# Patient Record
Sex: MALE | Race: WHITE | NOT HISPANIC OR LATINO | URBAN - METROPOLITAN AREA
[De-identification: names, ages, dates, MRNs, and addresses within clinical notes are randomized per-mention and may not be internally consistent; named-entity substitution may affect disease eponyms.]

---

## 2021-03-02 DIAGNOSIS — Z23 ENCOUNTER FOR IMMUNIZATION: ICD-10-CM

## 2021-03-04 ENCOUNTER — IMMUNIZATIONS (OUTPATIENT)
Dept: FAMILY MEDICINE CLINIC | Facility: HOSPITAL | Age: 71
End: 2021-03-04

## 2021-03-04 DIAGNOSIS — Z23 ENCOUNTER FOR IMMUNIZATION: Primary | ICD-10-CM

## 2021-03-04 PROCEDURE — 0001A SARS-COV-2 / COVID-19 MRNA VACCINE (PFIZER-BIONTECH) 30 MCG: CPT

## 2021-03-04 PROCEDURE — 91300 SARS-COV-2 / COVID-19 MRNA VACCINE (PFIZER-BIONTECH) 30 MCG: CPT

## 2021-03-23 ENCOUNTER — IMMUNIZATIONS (OUTPATIENT)
Dept: FAMILY MEDICINE CLINIC | Facility: HOSPITAL | Age: 71
End: 2021-03-23

## 2021-03-23 DIAGNOSIS — Z23 ENCOUNTER FOR IMMUNIZATION: Primary | ICD-10-CM

## 2021-03-23 PROCEDURE — 0002A SARS-COV-2 / COVID-19 MRNA VACCINE (PFIZER-BIONTECH) 30 MCG: CPT

## 2021-03-23 PROCEDURE — 91300 SARS-COV-2 / COVID-19 MRNA VACCINE (PFIZER-BIONTECH) 30 MCG: CPT

## 2022-05-09 NOTE — PROGRESS NOTES
PT Evaluation     Today's date: 5/10/2022  Patient name: Chente Rooney  : 1950  MRN: [id number]  Referring provider: Boom Dukes DO  Dx:   Encounter Diagnosis     ICD-10-CM    1  Partial hamstring tear, right, subsequent encounter  S76 819C                   Assessment  Assessment details: Chente Rooney is a 74 y o  male who presents with signs and symptoms consistent with referring diagnosis of hamstring strain  He demonstrates decreased flexibility, decreased LE strength, abnormal gait, decreased activity tolerance, and decreased function  Patient would benefit from skilled physical therapy in order to address said deficits and improve functional mobility  Understanding of Dx/Px/POC: good   Prognosis: good    Goals  STG:  Decrease pain 1 grade  Increase ROM >25 %  Independent with basic HEP    LTG:  Decrease pain 2 grades  Increase ROM 50%  Improve flexibility to moderate restriction  Increase FOTO to expected score   Increase strength to 5/5   Pt will be able to weed without difficulty   Normalize gait     Plan  Patient would benefit from: skilled physical therapy  Planned therapy interventions: abdominal trunk stabilization, joint mobilization, massage, neuromuscular re-education, patient education, postural training, strengthening, stretching, therapeutic activities, therapeutic exercise, flexibility, functional ROM exercises, gait training, home exercise program, balance, body mechanics training and manual therapy  Frequency: 2x week  Duration in weeks: 6  Treatment plan discussed with: patient        Subjective Evaluation    History of Present Illness  Mechanism of injury: Patient reports on  he was moving a very heavy ~ 300# birth bath and felt a pop in his right hamstring, Pain still present when moving or sitting, no prior hx   He has contiued swelling into leg and foot  Bruising has subsided   Feels increased pain when sitting and stretching muscle, bending down quickly to pick something up  No pain to drive, pain lifting his leg , cannot pull weeds because of pain  Cant sit for > 1 hour due to pain     Reynold Silva is a 74 y o  male complains of right thigh pain  Onset of the symptoms was 2022  Mechanism of injury: Lifting down statue-estimate over 300 pounds  Aggravating factors: Bending forward at the waist  Treatment to date: none  Symptoms have stabilized      The following portions of the patient's history were reviewed and updated as appropriate: allergies, current medications, past family history, past medical history, past social history, past surgical history and problem list         Pain  Current pain ratin  At worst pain ratin    Patient Goals  Patient goal: weeding/yard work, sit without pain, pick things up        Objective     Tenderness     Additional Tenderness Details  Pain at medial hamstring insertion of semi membranosis/tendonosis at ischial tuberosity     Active Range of Motion     Right Knee   Normal active range of motion    Additional Active Range of Motion Details  90/90 hamstring left:57 degrees        Right: 38 degrees with pain     Left quad severe restriction  Right quad moderate restriction   Right hamstring moderate restriction-with pain   Left hamstring moderate restriction       Strength/Myotome Testing     Right Hip   Planes of Motion   Flexion: 4  Extension: 4-  Abduction: 4    Right Knee   Flexion: 4-  Extension: 5             Precautions: None        Manuals 10                                                                Neuro Re-Ed             Quad sets              steamboats             SLS NV                                                                Ther Ex             bike NV            Hamstring seated  20"x3            Prone quad stretch strap NV            glute bridge  x10            Clamshells  NV            SLR flexion NV            SLR abduction  NV            Knee ext machine LAQ            Ham curl machine standing ham curl x10             Leg press              Prone hamstring eccentric lowering NV            TB side step  NV                         Ther Activity             Step up and over                                                                  Modalities

## 2022-05-10 ENCOUNTER — EVALUATION (OUTPATIENT)
Dept: PHYSICAL THERAPY | Facility: CLINIC | Age: 72
End: 2022-05-10
Payer: MEDICARE

## 2022-05-10 DIAGNOSIS — S76.311D PARTIAL HAMSTRING TEAR, RIGHT, SUBSEQUENT ENCOUNTER: Primary | ICD-10-CM

## 2022-05-10 PROCEDURE — 97161 PT EVAL LOW COMPLEX 20 MIN: CPT | Performed by: PHYSICAL THERAPIST

## 2022-05-10 PROCEDURE — 97110 THERAPEUTIC EXERCISES: CPT | Performed by: PHYSICAL THERAPIST

## 2022-05-10 NOTE — LETTER
May 11, 2022    Maximo Dee DO  [address]  [facility] [address] [address]    Patient: Dalton Valdes   YOB: 1950   Date of Visit: 5/10/2022     Encounter Diagnosis     ICD-10-CM    1  Partial hamstring tear, right, subsequent encounter  S76 311D        Dear Dr Savage Faulkner: Thank you for your recent referral of Dalton Valdes  Please review the attached evaluation summary from Anthony's recent visit  Please verify that you agree with the plan of care by signing the attached order  If you have any questions or concerns, please do not hesitate to call  I sincerely appreciate the opportunity to share in the care of one of your patients and hope to have another opportunity to work with you in the near future  Sincerely,    Gaurav Pham, PT      Referring Provider:      I certify that I have read the below Plan of Care and certify the need for these services furnished under this plan of treatment while under my care  Maximo DO Luiz  [facility] [address] [address]  Via Fax: [phone]          PT Evaluation     Today's date: 5/10/2022  Patient name: Dalton Valdes  : 1950  MRN: [id number]  Referring provider: Ena Vigil DO  Dx:   Encounter Diagnosis     ICD-10-CM    1  Partial hamstring tear, right, subsequent encounter  S76 311D                   Assessment  Assessment details: Dalton Valdes is a 74 y o  male who presents with signs and symptoms consistent with referring diagnosis of hamstring strain  He demonstrates decreased flexibility, decreased LE strength, abnormal gait, decreased activity tolerance, and decreased function  Patient would benefit from skilled physical therapy in order to address said deficits and improve functional mobility     Understanding of Dx/Px/POC: good   Prognosis: good    Goals  STG:  Decrease pain 1 grade  Increase ROM >25 %  Independent with basic HEP    LTG:  Decrease pain 2 grades  Increase ROM 50%  Improve flexibility to moderate restriction  Increase FOTO to expected score   Increase strength to 5/5   Pt will be able to weed without difficulty   Normalize gait     Plan  Patient would benefit from: skilled physical therapy  Planned therapy interventions: abdominal trunk stabilization, joint mobilization, massage, neuromuscular re-education, patient education, postural training, strengthening, stretching, therapeutic activities, therapeutic exercise, flexibility, functional ROM exercises, gait training, home exercise program, balance, body mechanics training and manual therapy  Frequency: 2x week  Duration in weeks: 6  Treatment plan discussed with: patient        Subjective Evaluation    History of Present Illness  Mechanism of injury: Patient reports on  he was moving a very heavy ~ 300# birth bath and felt a pop in his right hamstring, Pain still present when moving or sitting, no prior hx   He has contiued swelling into leg and foot  Bruising has subsided  Feels increased pain when sitting and stretching muscle, bending down quickly to pick something up  No pain to drive, pain lifting his leg , cannot pull weeds because of pain  Cant sit for > 1 hour due to pain     Guillermina Emery is a 74 y o  male complains of right thigh pain  Onset of the symptoms was 2022  Mechanism of injury: Lifting down statue-estimate over 300 pounds  Aggravating factors: Bending forward at the waist  Treatment to date: none  Symptoms have stabilized      The following portions of the patient's history were reviewed and updated as appropriate: allergies, current medications, past family history, past medical history, past social history, past surgical history and problem list         Pain  Current pain ratin  At worst pain ratin    Patient Goals  Patient goal: weeding/yard work, sit without pain, pick things up        Objective     Tenderness     Additional Tenderness Details  Pain at medial hamstring insertion of semi membranosis/tendonosis at ischial tuberosity     Active Range of Motion     Right Knee   Normal active range of motion    Additional Active Range of Motion Details  90/90 hamstring left:57 degrees        Right: 38 degrees with pain     Left quad severe restriction  Right quad moderate restriction   Right hamstring moderate restriction-with pain   Left hamstring moderate restriction       Strength/Myotome Testing     Right Hip   Planes of Motion   Flexion: 4  Extension: 4-  Abduction: 4    Right Knee   Flexion: 4-  Extension: 5             Precautions: None        Manuals 5/10                                                                Neuro Re-Ed             Quad sets              steamboats             SLS NV                                                                Ther Ex             bike NV            Hamstring seated  20"x3            Prone quad stretch strap NV            glute bridge  x10            Clamshells  NV            SLR flexion NV            SLR abduction  NV            Knee ext machine LAQ            Ham curl machine standing ham curl x10             Leg press              Prone hamstring eccentric lowering NV            TB side step  NV                         Ther Activity             Step up and over                                                                  Modalities

## 2022-05-18 ENCOUNTER — OFFICE VISIT (OUTPATIENT)
Dept: PHYSICAL THERAPY | Facility: CLINIC | Age: 72
End: 2022-05-18
Payer: MEDICARE

## 2022-05-18 DIAGNOSIS — S76.311D PARTIAL HAMSTRING TEAR, RIGHT, SUBSEQUENT ENCOUNTER: Primary | ICD-10-CM

## 2022-05-18 PROCEDURE — 97110 THERAPEUTIC EXERCISES: CPT | Performed by: PHYSICAL THERAPIST

## 2022-05-18 PROCEDURE — 97112 NEUROMUSCULAR REEDUCATION: CPT | Performed by: PHYSICAL THERAPIST

## 2022-05-18 PROCEDURE — 97140 MANUAL THERAPY 1/> REGIONS: CPT | Performed by: PHYSICAL THERAPIST

## 2022-05-18 NOTE — PROGRESS NOTES
Daily Note     Today's date: 2022  Patient name: Justus Schaffer  : 1950  MRN: [id number]  Referring provider: Hubert Koyanagi, DO  Dx:   Encounter Diagnosis     ICD-10-CM    1  Partial hamstring tear, right, subsequent encounter  S70 011Z                   Subjective: Patient with no complaints after IE  He states he feels "stiff"  He is concerned with having to do weeding later today  Objective: See treatment diary below      Assessment: Tolerated treatment well  Patient would benefit from continued PT  He has soreness with exercises, especially prone eccentric h/s curl with R TB  He has no increased heat noted with laser tx  No difficulty or complaints with exercises though continued cues needed throughout program for proper form  Plan: Progress treatment as tolerated         Precautions: None        Manuals 5/10 5/18           Laser to prox h/s   4:30 20 W                                                  Neuro Re-Ed             [employer]   20x10"           steamboats  NV           SLS  NV                                                               Ther Ex             bike NV 5'           Hamstring seated  20"x3 20"X3           Prone quad stretch strap NV            glute bridge  x10            Clamshells  NV NV           SLR flexion  NV           SLR abduction   NV           Knee ext machine LAQ 4# 3x10           Ham curl machine standing ham curl x10  2x10            Leg press              Prone hamstring eccentric lowering NV R TB loop x10           TB side step  NV 4 laps                         Ther Activity             Step up and over                                                                  Modalities

## 2022-05-25 ENCOUNTER — OFFICE VISIT (OUTPATIENT)
Dept: PHYSICAL THERAPY | Facility: CLINIC | Age: 72
End: 2022-05-25
Payer: MEDICARE

## 2022-05-25 DIAGNOSIS — S76.311D PARTIAL HAMSTRING TEAR, RIGHT, SUBSEQUENT ENCOUNTER: Primary | ICD-10-CM

## 2022-05-25 PROCEDURE — 97112 NEUROMUSCULAR REEDUCATION: CPT

## 2022-05-25 PROCEDURE — 97110 THERAPEUTIC EXERCISES: CPT

## 2022-05-25 NOTE — PROGRESS NOTES
Daily Note     Today's date: 2022  Patient name: Wes Qureshi  : 1950  MRN: [id number]  Referring provider: Asuncion Briceño DO  Dx:   Encounter Diagnosis     ICD-10-CM    1  Partial hamstring tear, right, subsequent encounter  A25 752E                   Subjective: Pt reports he was only "a little sore" following last visit  Objective: See treatment diary below      Assessment: Tolerated treatment fair  Patient would benefit from continued PT  Difficulty w/ SL hip abduction due to decreased strength  No adverse effects noted w/ Laser tx  Cues still needed to maintain proper technique  No increased sx noted w/ TE performed  Plan: Progress treatment as tolerated         Precautions: None        Manuals 5/10 5/18 5/25          Laser to prox h/s   4:30 21 W 4:30 20W                                                 Neuro Re-Ed             [employer]   20x10" 10x10"          steamboats  NV           SLS  NV                                                               Ther Ex             bike NV 5' 5'          Hamstring seated  20"x3 20"X3 20"x3          Prone quad stretch strap NV            glute bridge  x10  x15          Clamshells  NV NV NV          SLR flexion  NV x10           SLR abduction   NV x10          Knee ext machine LAQ 4# 3x10 4# 2x10          Ham curl machine standing ham curl x10  2x10  2x10          Leg press              Prone hamstring eccentric lowering NV R TB loop x10 RTB loop  2x  10          TB side step  NV 4 laps  4 laps                       Ther Activity             Step up and over                                                                  Modalities                                           1:1 1115-12

## 2022-06-01 ENCOUNTER — OFFICE VISIT (OUTPATIENT)
Dept: PHYSICAL THERAPY | Facility: CLINIC | Age: 72
End: 2022-06-01
Payer: MEDICARE

## 2022-06-01 DIAGNOSIS — S76.311D PARTIAL HAMSTRING TEAR, RIGHT, SUBSEQUENT ENCOUNTER: Primary | ICD-10-CM

## 2022-06-01 PROCEDURE — 97112 NEUROMUSCULAR REEDUCATION: CPT

## 2022-06-01 PROCEDURE — 97110 THERAPEUTIC EXERCISES: CPT

## 2022-06-01 NOTE — PROGRESS NOTES
Daily Note     Today's date: 2022  Patient name: Sree Sanchez  : 1950  MRN: [id number]  Referring provider: Iván Broderick DO  Dx:   Encounter Diagnosis     ICD-10-CM    1  Partial hamstring tear, right, subsequent encounter  S72 284D                   Subjective: Pt reports no increase in sx lately, and states he is "trying not to overdo it"  Objective: See treatment diary below      Assessment: Tolerated treatment well  Patient would benefit from continued PT  Fair tolerance to addition of steamboats; watch form w/ hip extension  No adverse effects w/ Laser tx  Pt notes "stiffness" post tx  Plan: Continue per plan of care        Precautions: None        Manuals 5/10 5/18 5/25 6/1         Laser to prox h/s   4:30 20 W 4:30 20W 20W 4:30                                                Neuro Re-Ed             Quad sets   20x10" 10x10"          steamboats  NV  RTB 10 ea BL         SLS  NV                                                               Ther Ex             bike NV 5' 5' 5'         Hamstring seated  20"x3 20"X3 20"x3 20"x3         Prone quad stretch strap NV            glute bridge  x10  x15 2x10         Clamshells  NV NV NV NV         SLR flexion  NV x10  x15         SLR abduction   NV x10 x15         Knee ext machine LAQ 4# 3x10 4# 2x10 4# 2x10         Ham curl machine standing ham curl x10  2x10  2x10          Leg press              Prone hamstring eccentric lowering NV R TB loop x10 RTB loop  2x  10 RTB loop 2x10         TB side step  NV 4 laps  4 laps 2 laps RTB                       Ther Activity             Step up and over                                                                  Modalities

## 2022-06-08 ENCOUNTER — OFFICE VISIT (OUTPATIENT)
Dept: PHYSICAL THERAPY | Facility: CLINIC | Age: 72
End: 2022-06-08
Payer: MEDICARE

## 2022-06-08 DIAGNOSIS — S76.311D PARTIAL HAMSTRING TEAR, RIGHT, SUBSEQUENT ENCOUNTER: Primary | ICD-10-CM

## 2022-06-08 PROCEDURE — 97530 THERAPEUTIC ACTIVITIES: CPT

## 2022-06-08 PROCEDURE — 97112 NEUROMUSCULAR REEDUCATION: CPT

## 2022-06-08 PROCEDURE — 97110 THERAPEUTIC EXERCISES: CPT

## 2022-06-08 NOTE — PROGRESS NOTES
Daily Note     Today's date: 2022  Patient name: Ivelisse Gann  : 1950  MRN: [id number]  Referring provider: Alva Kendrick DO  Dx:   Encounter Diagnosis     ICD-10-CM    1  Partial hamstring tear, right, subsequent encounter  S76 374D                   Subjective: Pt states he is feeling "a little better"  Pt states he continues to have discomfort when bending over to pull weeds  Pt also reports he notices sensitivity when sitting in certain chairs, depending on where the edge of the seat hits the hamstring  Objective: See treatment diary below      Assessment: Tolerated treatment well  Patient would benefit from continued PT  Tolerates addition of step up and overs well  Few cues needed for proper technique w/ TE  Challenged by S/L hip abduction due to weakness  No adverse effects noted w/ Laser  Plan: Progress treatment as tolerated  MDV   RE NV       Precautions: None        Manuals 5/10 5/18 5/25 6/1 6/8        Laser to prox h/s   4:30 20 W 4:30 20W 20W 4:30 20W 4:30                                               Neuro Re-Ed             Quad sets   20x10" 10x10"          steamboats  NV  RTB 10 ea BL RTB 10 ea BL        SLS  NV   NV                                                            Ther Ex             bike NV 5' 5' 5' 5'        Hamstring seated  20"x3 20"X3 20"x3 20"x3 20" x3        Prone quad stretch strap NV            glute bridge  x10  x15 2x10 2x10        Clamshells  NV NV NV NV         SLR flexion  NV x10  x15 20        SLR abduction   NV x10 x15 15        Knee ext machine LAQ 4# 3x10 4# 2x10 4# 2x10 4# 2x10        Ham curl machine standing ham curl x10  2x10  2x10          Leg press              Prone hamstring eccentric lowering NV R TB loop x10 RTB loop  2x  10 RTB loop 2x10 RTB loop 2x10        TB side step  NV 4 laps  4 laps 2 laps RTB  RTB 2 laps                     Ther Activity             Step up and over      6" x10 Modalities

## 2022-06-15 ENCOUNTER — EVALUATION (OUTPATIENT)
Dept: PHYSICAL THERAPY | Facility: CLINIC | Age: 72
End: 2022-06-15
Payer: MEDICARE

## 2022-06-15 DIAGNOSIS — S76.311D PARTIAL HAMSTRING TEAR, RIGHT, SUBSEQUENT ENCOUNTER: Primary | ICD-10-CM

## 2022-06-15 PROCEDURE — 97530 THERAPEUTIC ACTIVITIES: CPT | Performed by: PHYSICAL THERAPIST

## 2022-06-15 PROCEDURE — 97110 THERAPEUTIC EXERCISES: CPT | Performed by: PHYSICAL THERAPIST

## 2022-06-15 PROCEDURE — 97112 NEUROMUSCULAR REEDUCATION: CPT | Performed by: PHYSICAL THERAPIST

## 2022-06-15 NOTE — LETTER
2022    Maximo Dee DO  [address]  [facility] [address]    Patient: Dalton Valdes   YOB: 1950   Date of Visit: 6/15/2022     Encounter Diagnosis     ICD-10-CM    1  Partial hamstring tear, right, subsequent encounter  S76 311D        Dear Dr Savage Faulkner: Thank you for your recent referral of Dalton Valdes  Please review the attached evaluation summary from Anthony's recent visit  Please verify that you agree with the plan of care by signing the attached order  If you have any questions or concerns, please do not hesitate to call  I sincerely appreciate the opportunity to share in the care of one of your patients and hope to have another opportunity to work with you in the near future  Sincerely,    Gaurav Pham, PT      Referring Provider:      I certify that I have read the below Plan of Care and certify the need for these services furnished under this plan of treatment while under my care  Maximo Dee DO  [facility] [address]  Via Fax: [phone]          PT Re-Evaluation     Today's date: 6/15/2022  Patient name: Dalton Valdes  : 1950  MRN: [id number]  Referring provider: Ena Vigil DO  Dx:   Encounter Diagnosis     ICD-10-CM    1  Partial hamstring tear, right, subsequent encounter  S76 311D                   Assessment  Assessment details: Dalton Valdes is a 74 y o  male who presents with signs and symptoms consistent with referring diagnosis of hamstring strain  Although patient has been coming once a week, he has demonstrated progression in hamstring strength and length since since beginning PT  He continues to have moderate deficits in flexibility, decreased activity tolerance ,and abnormal gait  Patient would benefit from continued physical therapy in order to address said deficits and improve functional mobility          Understanding of Dx/Px/POC: good   Prognosis: good    Goals  STG:  Decrease pain 1 grade-met  Increase ROM >25 %-met  Independent with basic HEP-partially met     LTG:  Decrease pain 2 grades  Increase ROM 50%-mostly met   Improve flexibility to moderate restriction-progressing   Increase FOTO to expected score   Increase strength to 5/5 -mostly met   Pt will be able to weed without difficulty -progressing   Normalize gait -progressing     Plan  Plan details: 1-2x/wk for 4-6 weeks   Patient would benefit from: skilled physical therapy  Planned therapy interventions: abdominal trunk stabilization, joint mobilization, massage, neuromuscular re-education, patient education, postural training, strengthening, stretching, therapeutic activities, therapeutic exercise, flexibility, functional ROM exercises, gait training, home exercise program, balance, body mechanics training and manual therapy  Treatment plan discussed with: patient        Subjective Evaluation    History of Present Illness  Mechanism of injury: Patient reports he still tries to be careful with his hamstring and how he is walking and what he is doing  He notes his hips have been painful for the last few months and isnt sure why, both sides  He states he cannot walk as long or due much due to pain  He has more hip pain when he goes up incline      -----------------------------------------------------------------------------------------  Patient reports on 4/23 he was moving a very heavy ~ 300# birth bath and felt a pop in his right hamstring, Pain still present when moving or sitting, no prior hx   He has contiued swelling into leg and foot  Bruising has subsided  Feels increased pain when sitting and stretching muscle, bending down quickly to pick something up  No pain to drive, pain lifting his leg , cannot pull weeds because of pain  Cant sit for > 1 hour due to pain     Marielos Guzman is a 74 y o  male complains of right thigh pain  Onset of the symptoms was April 23, 2022   Mechanism of injury: Lifting down statue-estimate over 300 pounds  Aggravating factors: Bending forward at the waist  Treatment to date: none  Symptoms have stabilized      The following portions of the patient's history were reviewed and updated as appropriate: allergies, current medications, past family history, past medical history, past social history, past surgical history and problem list         Pain  Current pain ratin  At worst pain ratin    Patient Goals  Patient goal: weeding/yard work, sit without pain, pick things up        Objective     Tenderness     Additional Tenderness Details  Pain at medial hamstring insertion of semi membranosis/tendonosis at ischial tuberosity     Active Range of Motion     Right Knee   Normal active range of motion    Additional Active Range of Motion Details  90/90 hamstring left:57 degrees        Right: 58 degrees without pain     Left quad severe restriction  Right quad moderate restriction   Right hamstring moderate restriction-with pain   Left hamstring moderate restriction       Strength/Myotome Testing     Right Hip   Planes of Motion   Flexion: 4  Extension: 4-  Abduction: 4    Right Knee   Flexion: 4+  Extension: 5             Precautions: None          Manuals 5/10 5/18 5/25 6/1 6/8 6/15 RE       Laser to prox h/s   4:30 20 W 4:30 20W 20W 4:30 20W 4:30 20W 4:30                                              Neuro Re-Ed             Quad sets   20x10" 10x10"          steamboats  NV  RTB 10 ea BL RTB 10 ea BL R Tb 10x bl 3 way        SLS  NV   NV                                                            Ther Ex             bike NV 5' 5' 5' 5' 5'        Hamstring seated  20"x3 20"X3 20"x3 20"x3 20" x3        Prone quad stretch strap NV            glute bridge  x10  x15 2x10 2x10 2x10       Clamshells  NV NV NV NV  NV       SLR flexion  NV x10  x15 20 20x       SLR abduction   NV x10 x15 15 20x        Knee ext machine LAQ 4# 3x10 4# 2x10 4# 2x10 4# 2x10 5# 2x10  machine       Ham curl machine standing ham curl x10 2x10  2x10   NV light        Leg press              Prone hamstring eccentric lowering NV R TB loop x10 RTB loop  2x  10 RTB loop 2x10 RTB loop 2x10 R TB 2x10        TB side step  NV 4 laps  4 laps 2 laps RTB  RTB 2 laps R TB 2laps        Piriformis stretch bl       NV        Ther Activity             Step up and over      6" x10                                                            Modalities

## 2022-06-15 NOTE — PROGRESS NOTES
PT Re-Evaluation     Today's date: 6/15/2022  Patient name: Campbell Leavitt  : 1950  MRN: [id number]  Referring provider: Cher Sheldon DO  Dx:   Encounter Diagnosis     ICD-10-CM    1  Partial hamstring tear, right, subsequent encounter  S76 311D                   Assessment  Assessment details: Campbell Leavitt is a 74 y o  male who presents with signs and symptoms consistent with referring diagnosis of hamstring strain  Although patient has been coming once a week, he has demonstrated progression in hamstring strength and length since since beginning PT  He continues to have moderate deficits in flexibility, decreased activity tolerance ,and abnormal gait  Patient would benefit from continued physical therapy in order to address said deficits and improve functional mobility  Understanding of Dx/Px/POC: good   Prognosis: good    Goals  STG:  Decrease pain 1 grade-met  Increase ROM >25 %-met  Independent with basic HEP-partially met     LTG:  Decrease pain 2 grades  Increase ROM 50%-mostly met   Improve flexibility to moderate restriction-progressing   Increase FOTO to expected score   Increase strength to 5/5 -mostly met   Pt will be able to weed without difficulty -progressing   Normalize gait -progressing     Plan  Plan details: 1-2x/wk for 4-6 weeks   Patient would benefit from: skilled physical therapy  Planned therapy interventions: abdominal trunk stabilization, joint mobilization, massage, neuromuscular re-education, patient education, postural training, strengthening, stretching, therapeutic activities, therapeutic exercise, flexibility, functional ROM exercises, gait training, home exercise program, balance, body mechanics training and manual therapy  Treatment plan discussed with: patient        Subjective Evaluation    History of Present Illness  Mechanism of injury: Patient reports he still tries to be careful with his hamstring and how he is walking and what he is doing   He notes his hips have been painful for the last few months and isnt sure why, both sides  He states he cannot walk as long or due much due to pain  He has more hip pain when he goes up incline      -----------------------------------------------------------------------------------------  Patient reports on  he was moving a very heavy ~ 300# birth bath and felt a pop in his right hamstring, Pain still present when moving or sitting, no prior hx   He has contiued swelling into leg and foot  Bruising has subsided  Feels increased pain when sitting and stretching muscle, bending down quickly to pick something up  No pain to drive, pain lifting his leg , cannot pull weeds because of pain  Cant sit for > 1 hour due to pain     Margarita Still is a 74 y o  male complains of right thigh pain  Onset of the symptoms was 2022  Mechanism of injury: Lifting down statue-estimate over 300 pounds  Aggravating factors: Bending forward at the waist  Treatment to date: none  Symptoms have stabilized      The following portions of the patient's history were reviewed and updated as appropriate: allergies, current medications, past family history, past medical history, past social history, past surgical history and problem list         Pain  Current pain ratin  At worst pain ratin    Patient Goals  Patient goal: weeding/yard work, sit without pain, pick things up        Objective     Tenderness     Additional Tenderness Details  Pain at medial hamstring insertion of semi membranosis/tendonosis at ischial tuberosity     Active Range of Motion     Right Knee   Normal active range of motion    Additional Active Range of Motion Details  90/90 hamstring left:57 degrees        Right: 58 degrees without pain     Left quad severe restriction  Right quad moderate restriction   Right hamstring moderate restriction-with pain   Left hamstring moderate restriction       Strength/Myotome Testing     Right Hip   Planes of Motion   Flexion: 4  Extension: 4-  Abduction: 4    Right Knee   Flexion: 4+  Extension: 5             Precautions: None          Manuals 5/10 5/18 5/25 6/1 6/8 6/15 RE       Laser to prox h/s   4:30 20 W 4:30 20W 20W 4:30 20W 4:30 20W 4:30                                              Neuro Re-Ed             Quad sets   20x10" 10x10"          steamboats  NV  RTB 10 ea BL RTB 10 ea BL R Tb 10x bl 3 way        SLS  NV   NV                                                            Ther Ex             bike NV 5' 5' 5' 5' 5'        Hamstring seated  20"x3 20"X3 20"x3 20"x3 20" x3        Prone quad stretch strap NV            glute bridge  x10  x15 2x10 2x10 2x10       Clamshells  NV NV NV NV  NV       SLR flexion  NV x10  x15 20 20x       SLR abduction   NV x10 x15 15 20x        Knee ext machine LAQ 4# 3x10 4# 2x10 4# 2x10 4# 2x10 5# 2x10  machine       Ham curl machine standing ham curl x10  2x10  2x10   NV light        Leg press              Prone hamstring eccentric lowering NV R TB loop x10 RTB loop  2x  10 RTB loop 2x10 RTB loop 2x10 R TB 2x10        TB side step  NV 4 laps  4 laps 2 laps RTB  RTB 2 laps R TB 2laps        Piriformis stretch bl       NV        Ther Activity             Step up and over      6" x10                                                            Modalities

## 2022-06-22 ENCOUNTER — OFFICE VISIT (OUTPATIENT)
Dept: PHYSICAL THERAPY | Facility: CLINIC | Age: 72
End: 2022-06-22
Payer: MEDICARE

## 2022-06-22 DIAGNOSIS — S76.311D PARTIAL HAMSTRING TEAR, RIGHT, SUBSEQUENT ENCOUNTER: Primary | ICD-10-CM

## 2022-06-22 PROCEDURE — 97530 THERAPEUTIC ACTIVITIES: CPT

## 2022-06-22 PROCEDURE — 97112 NEUROMUSCULAR REEDUCATION: CPT

## 2022-06-22 PROCEDURE — 97110 THERAPEUTIC EXERCISES: CPT

## 2022-06-22 NOTE — PROGRESS NOTES
Daily Note     Today's date: 2022  Patient name: Magui Oliver  : 1950  MRN: [id number]  Referring provider: Phong Barriga DO  Dx:   Encounter Diagnosis     ICD-10-CM    1  Partial hamstring tear, right, subsequent encounter  S79 145M                   Subjective: Pt states he is feeling "alright"  Objective: See treatment diary below      Assessment: Tolerated treatment well  Patient would benefit from continued PT  Cues needed for proper rep count w/ TE  Good tolerance to addition of seated knee flex/ext machine; no increased sx noted  No adverse effects noted w/ Laser tx  Plan: Continue per plan of care        Precautions: None          Manuals 5/10 5/18 5/25 6/1 6/8 6/15 RE       Laser to prox h/s   4:30 20 W 4:30 20W 20W 4:30 20W 4:30 20W 4:30 20W 4:30                                             Neuro Re-Ed             Quad sets   20x10" 10x10"          steamboats  NV  RTB 10 ea BL RTB 10 ea BL R Tb 10x bl 3 way  RTB 10 ea BL 3 way      SLS  NV   NV                                                            Ther Ex             bike NV 5' 5' 5' 5' 5'  5'      Hamstring seated  20"x3 20"X3 20"x3 20"x3 20" x3        Prone quad stretch strap NV            glute bridge  x10  x15 2x10 2x10 2x10 2x10      Clamshells  NV NV NV NV  NV       SLR flexion  NV x10  x15 20 20x 20      SLR abduction   NV x10 x15 15 20x  20      Knee ext machine LAQ 4# 3x10 4# 2x10 4# 2x10 4# 2x10 5# 2x10  machine 11# 2x10      Ham curl machine standing ham curl x10  2x10  2x10   NV light  11# 2x10      Leg press              Prone hamstring eccentric lowering NV R TB loop x10 RTB loop  2x  10 RTB loop 2x10 RTB loop 2x10 R TB 2x10  RTB 2x10      TB side step  NV 4 laps  4 laps 2 laps RTB  RTB 2 laps R TB 2laps  RTB 2 laps      Piriformis stretch bl       NV  15"x3 R      Ther Activity             Step up and over      6" x10                                                            Modalities

## 2022-06-29 ENCOUNTER — OFFICE VISIT (OUTPATIENT)
Dept: PHYSICAL THERAPY | Facility: CLINIC | Age: 72
End: 2022-06-29
Payer: MEDICARE

## 2022-06-29 DIAGNOSIS — S76.311D PARTIAL HAMSTRING TEAR, RIGHT, SUBSEQUENT ENCOUNTER: Primary | ICD-10-CM

## 2022-06-29 PROCEDURE — 97530 THERAPEUTIC ACTIVITIES: CPT

## 2022-06-29 PROCEDURE — 97110 THERAPEUTIC EXERCISES: CPT

## 2022-06-29 PROCEDURE — 97112 NEUROMUSCULAR REEDUCATION: CPT

## 2022-06-29 NOTE — PROGRESS NOTES
Daily Note     Today's date: 2022  Patient name: Karin Caraballo  : 1950  MRN: [id number]  Referring provider: Annamaria Gee DO  Dx:   Encounter Diagnosis     ICD-10-CM    1  Partial hamstring tear, right, subsequent encounter  K24 [id number]                   Subjective: Pt reports no adverse effects following last tx  Pt b/l hip discomfort when walking  Objective: See treatment diary below      Assessment: Tolerated treatment fair  Patient would benefit from continued PT   No adverse effects noted w/ Laser tx  Occasional cues needed for proper technique w/ TE program   Continues to be challenged by S/L hip abduction  Plan: Continue per plan of care        Precautions: None          Manuals 5/10 5/18 5/25 6/1 6/8 6/15 RE      Laser to prox h/s   4:30 21 W 4:30 20W 20W 4:30 20W 4:30 20W 4:30 20W 4:30 20W 4:30                                            Neuro Re-Ed             Quad sets   20x10" 10x10"          steamboats  NV  RTB 10 ea BL RTB 10 ea BL R Tb 10x bl 3 way  RTB 10 ea BL 3 way RTB 10 ea BL 3 way     SLS  NV   NV                                                            Ther Ex             bike NV 5' 5' 5' 5' 5'  5' 5'     Hamstring seated  20"x3 20"X3 20"x3 20"x3 20" x3        Prone quad stretch strap NV            glute bridge  x10  x15 2x10 2x10 2x10 2x10 2x10     Clamshells  NV NV NV NV  NV       SLR flexion  NV x10  x15 20 20x 20 1# 20     SLR abduction   NV x10 x15 15 20x  20 1# 20     Knee ext machine LAQ 4# 3x10 4# 2x10 4# 2x10 4# 2x10 5# 2x10  machine 11# 2x10 machine 11# 2x10     Ham curl machine standing ham curl x10  2x10  2x10   NV light  11# 2x10 11# 2x10     Leg press              Prone hamstring eccentric lowering NV R TB loop x10 RTB loop  2x  10 RTB loop 2x10 RTB loop 2x10 R TB 2x10  RTB 2x10 GTB NV     TB side step  NV 4 laps  4 laps 2 laps RTB  RTB 2 laps R TB 2laps  RTB 2 laps RTB 2 laps     Piriformis stretch bl       NV  15"x3 R NV     Ther Activity Step up and over      6" x10                                                            Modalities

## 2022-07-06 ENCOUNTER — OFFICE VISIT (OUTPATIENT)
Dept: PHYSICAL THERAPY | Facility: CLINIC | Age: 72
End: 2022-07-06
Payer: MEDICARE

## 2022-07-06 DIAGNOSIS — S76.311D PARTIAL HAMSTRING TEAR, RIGHT, SUBSEQUENT ENCOUNTER: Primary | ICD-10-CM

## 2022-07-06 PROCEDURE — 97530 THERAPEUTIC ACTIVITIES: CPT

## 2022-07-06 PROCEDURE — 97110 THERAPEUTIC EXERCISES: CPT

## 2022-07-06 PROCEDURE — 97112 NEUROMUSCULAR REEDUCATION: CPT

## 2022-07-06 NOTE — PROGRESS NOTES
Daily Note     Today's date: 2022  Patient name: Melanie Rios  : 1950  MRN: [id number]  Referring provider: Waleska Rosen DO  Dx:   Encounter Diagnosis     ICD-10-CM    1  Partial hamstring tear, right, subsequent encounter  S57 111Z                   Subjective: Pt reports no new complaints since last visit  Objective: See treatment diary below      Assessment: Tolerated treatment fair  Patient would benefit from continued PT  Challenged/fatigued by progression to 22lbs w/ HS curl machine  Cues needed for proper technique w/ steamboats  Continues to be challenged by SL hip abduction  Decreased wattage to 18W for the last minute of Laser tx due to pt not tolerating the warmth; no skin irritation or redness noted  Plan: Progress treatment as tolerated         Precautions: None          Manuals 5/10 5/18 5/25 6/1 6/8 6/15 RE     Laser to prox h/s   4:30 21 W 4:30 20W 20W 4:30 20W 4:30 20W 4:30 20W 4:30 20W 4:30 20W 4:30                                           Neuro Re-Ed             Quad sets   20x10" 10x10"          steamboats  NV  RTB 10 ea BL RTB 10 ea BL R Tb 10x bl 3 way  RTB 10 ea BL 3 way RTB 10 ea BL 3 way RTB 10 ea BL 3 way    SLS  NV   NV                                                            Ther Ex             bike NV 5' 5' 5' 5' 5'  5' 5' 5'    Hamstring seated  20"x3 20"X3 20"x3 20"x3 20" x3        Prone quad stretch strap NV            glute bridge  x10  x15 2x10 2x10 2x10 2x10 2x10 2x10    Clamshells  NV NV NV NV  NV       SLR flexion  NV x10  x15 20 20x 20 1# 20 1# 20    SLR abduction   NV x10 x15 15 20x  20 1# 20 1# 20    Knee ext machine LAQ 4# 3x10 4# 2x10 4# 2x10 4# 2x10 5# 2x10  machine 11# 2x10 machine 11# 2x10 machine 22# x15    Ham curl machine standing ham curl x10  2x10  2x10   NV light  11# 2x10 11# 2x10 22# x15    Leg press              Prone hamstring eccentric lowering NV R TB loop x10 RTB loop  2x  10 RTB loop 2x10 RTB loop 2x10 R TB 2x10 RTB 2x10 GTB NV GTB 2x10    TB side step  NV 4 laps  4 laps 2 laps RTB  RTB 2 laps R TB 2laps  RTB 2 laps RTB 2 laps RTB 2 laps    Piriformis stretch bl       NV  15"x3 R NV     Ther Activity             Step up and over      6" x10                                                            Modalities

## 2022-07-13 ENCOUNTER — EVALUATION (OUTPATIENT)
Dept: PHYSICAL THERAPY | Facility: CLINIC | Age: 72
End: 2022-07-13
Payer: MEDICARE

## 2022-07-13 DIAGNOSIS — M54.50 CHRONIC BILATERAL LOW BACK PAIN, UNSPECIFIED WHETHER SCIATICA PRESENT: ICD-10-CM

## 2022-07-13 DIAGNOSIS — S76.311D PARTIAL HAMSTRING TEAR, RIGHT, SUBSEQUENT ENCOUNTER: ICD-10-CM

## 2022-07-13 DIAGNOSIS — G89.29 CHRONIC BILATERAL LOW BACK PAIN, UNSPECIFIED WHETHER SCIATICA PRESENT: ICD-10-CM

## 2022-07-13 DIAGNOSIS — M48.061 SPINAL STENOSIS OF LUMBAR REGION, UNSPECIFIED WHETHER NEUROGENIC CLAUDICATION PRESENT: Primary | ICD-10-CM

## 2022-07-13 PROCEDURE — 97530 THERAPEUTIC ACTIVITIES: CPT | Performed by: PHYSICAL THERAPIST

## 2022-07-13 PROCEDURE — 97110 THERAPEUTIC EXERCISES: CPT | Performed by: PHYSICAL THERAPIST

## 2022-07-13 PROCEDURE — 97162 PT EVAL MOD COMPLEX 30 MIN: CPT | Performed by: PHYSICAL THERAPIST

## 2022-07-13 NOTE — PROGRESS NOTES
PT Re-Evaluation     Today's date: 2022  Patient name: Yaquelin Marcos  : 1950  MRN: [id number]  Referring provider: Shavonne Harris MD  Dx:   Encounter Diagnosis     ICD-10-CM    1  Spinal stenosis of lumbar region, unspecified whether neurogenic claudication present  M48 061    2  Chronic bilateral low back pain, unspecified whether sciatica present  M54 50     G89 29    3  Partial hamstring tear, right, subsequent encounter  S76 311D        Start Time: 1115  Stop Time: 1215  Total time in clinic (min): 60 minutes    Assessment  Assessment details: Yaquelin Marcos is a 74 y o  male who presents with signs and symptoms consistent with referring diagnosis of hamstring strain he has demonstrated improved pain levels and function with hamstring and is happy with progress  He presents today with added complaints of bilateral hip/glute pain that are consistent with diagnosis of lumbar stenosis  He demonstrates continued LE tightness ,decreased core stability, decreased l/s ROM, and decreased function and activity tolerance  Patient would benefit from continued physical therapy in order to address said deficits and improve functional mobility          Understanding of Dx/Px/POC: good   Prognosis: good    Goals  STG:  Decrease pain 1 grade-met  Increase ROM >25 %-met  Independent with basic HEP-partially met     LTG:  Decrease pain 2 grades- met  Increase ROM 50%-mostly met -not met   Improve flexibility to moderate restriction-progressing   Increase FOTO to expected score -progressing   Pt will walk > 50 yards without difficulty   Pt will be able to self-modulate symptoms   Increase strength to 5/5 -mostly met   Pt will be able to weed without difficulty -progressing   Normalize gait -progressing     Plan  Plan details: 1-2x/wk for 4-6 weeks   Patient would benefit from: skilled physical therapy  Planned therapy interventions: abdominal trunk stabilization, joint mobilization, massage, neuromuscular re-education, patient education, postural training, strengthening, stretching, therapeutic activities, therapeutic exercise, flexibility, functional ROM exercises, gait training, home exercise program, balance, body mechanics training and manual therapy  Treatment plan discussed with: patient        Subjective Evaluation    History of Present Illness  Mechanism of injury: Patient notes he still has a hard time walking because he still has pain in his hip and glute on both sides  He states he cannot walk > 100 feet without having pain  He states it will stop him from walking any distance  He states he has discs done in his back, in the 90's but does not recall  He reports he is still "babying" his hamstring a little bit  He is most concerned with his hip pain      -----------------------------------------------------------------------------------  Patient reports he still tries to be careful with his hamstring and how he is walking and what he is doing  He notes his hips have been painful for the last few months and isnt sure why, both sides  He states he cannot walk as long or due much due to pain  He has more hip pain when he goes up incline      -----------------------------------------------------------------------------------------  Patient reports on 4/23 he was moving a very heavy ~ 300# birth bath and felt a pop in his right hamstring, Pain still present when moving or sitting, no prior hx   He has contiued swelling into leg and foot  Bruising has subsided  Feels increased pain when sitting and stretching muscle, bending down quickly to pick something up  No pain to drive, pain lifting his leg , cannot pull weeds because of pain  Cant sit for > 1 hour due to pain     Nicole Schulte is a 74 y o  male complains of right thigh pain  Onset of the symptoms was April 23, 2022  Mechanism of injury: Lifting down statue-estimate over 300 pounds   Aggravating factors: Bending forward at the waist  Treatment to date: none  Symptoms have stabilized      The following portions of the patient's history were reviewed and updated as appropriate: allergies, current medications, past family history, past medical history, past social history, past surgical history and problem list         Pain  Current pain ratin  At worst pain ratin    Patient Goals  Patient goal: weeding/yard work, sit without pain, pick things up        Objective     Tenderness     Additional Tenderness Details  Pain at medial hamstring insertion of semi membranosis/tendonosis at ischial tuberosity     Active Range of Motion     Lumbar   Flexion: 77 degrees   Extension: 11 (increased peripheralization ) degrees  with pain Restriction level: maximal  Left lateral flexion: 1 degrees       Right lateral flexion: 12 degrees     Right Knee   Normal active range of motion    Additional Active Range of Motion Details  90/90 hamstring left:57 degrees        Right: 58 degrees without pain     Left quad severe restriction  Right quad moderate restriction   Right hamstring moderate restriction-with pain   Left hamstring moderate restriction     Mechanical Assessment    Cervical      Thoracic      Lumbar    Standing flexion: repeated movements   Pain location:no change  Sitting flexion: sustained positions  Pain location: centralized  Pain intensity: better  Standing extension: repeated movements  Pain location: peripheralized  Pain intensity: worse    Strength/Myotome Testing     Left Hip   Planes of Motion   Flexion: 4+  Abduction: 4    Right Hip   Planes of Motion   Flexion: 4+  Extension: 4  Abduction: 4    Right Knee   Flexion: 4+  Extension: 5      Flowsheet Rows    Flowsheet Row Most Recent Value   PT/OT G-Codes    Current Score 60   Projected Score 69             Precautions: None          Manuals 6/1 6/8 6/15 RE  RE   Laser to prox h/s  20W 4:30 20W 4:30 20W 4:30 20W 4:30 20W 4:30 20W 4:30                                  Neuro Re-Ed Quad sets           steamboats RTB 10 ea BL RTB 10 ea BL R Tb 10x bl 3 way  RTB 10 ea BL 3 way RTB 10 ea BL 3 way RTB 10 ea BL 3 way    TA       NV   PPT       NV   TA with march       NV   TA with iso abd/add        NV                                                                                             Ther Ex          bike 5' 5' 5'  5' 5' 5' 5'    Hamstring seated  20"x3 20" x3     NV   Prone quad stretch strap       NV   glute bridge  2x10 2x10 2x10 2x10 2x10 2x10    Clamshells  NV  NV    NV   SLR flexion x15 20 20x 20 1# 20 1# 20    SLR abduction  x15 15 20x  20 1# 20 1# 20    Knee ext machine 4# 2x10 4# 2x10 5# 2x10  machine 11# 2x10 machine 11# 2x10 machine 22# x15 22# mahine 2x10    Ham curl machine   NV light  11# 2x10 11# 2x10 22# x15 22# 2x10    Leg press           Prone hamstring eccentric lowering RTB loop 2x10 RTB loop 2x10 R TB 2x10  RTB 2x10 GTB NV GTB 2x10    TB side step  2 laps RTB  RTB 2 laps R TB 2laps  RTB 2 laps RTB 2 laps RTB 2 laps    Piriformis stretch bl    NV  15"x3 R NV     Ther Activity          Ball rolls fwd/side       NV   Seated flexion stretch       NV   Down dogs at bar        NV   DKCTC w/ ball       NV   LTR with ball       NV                                                               Modalities

## 2022-07-13 NOTE — LETTER
2022    Lianna Peralta MD  [address] [place] [id number]    Patient: Austin Arzola   YOB: 1950   Date of Visit: 2022     Encounter Diagnosis     ICD-10-CM    1  Spinal stenosis of lumbar region, unspecified whether neurogenic claudication present  M48 061    2  Chronic bilateral low back pain, unspecified whether sciatica present  M54 50     G89 29    3  Partial hamstring tear, right, subsequent encounter  F70 118B        Dear Dr Phillips Her: Thank you for your recent referral of Austin Arzola  Please review the attached evaluation summary from Anthony's recent visit  Please verify that you agree with the plan of care by signing the attached order  If you have any questions or concerns, please do not hesitate to call  I sincerely appreciate the opportunity to share in the care of one of your patients and hope to have another opportunity to work with you in the near future  Sincerely,    Angelica Parekh, PT      Referring Provider:      I certify that I have read the below Plan of Care and certify the need for these services furnished under this plan of treatment while under my care  Lianna Peralta MD  [address] [place] [id number]  Via Fax: [phone]          PT Re-Evaluation     Today's date: 2022  Patient name: Austin Arzola  : 1950  MRN: [id number]  Referring provider: Jarrod Roa MD  Dx:   Encounter Diagnosis     ICD-10-CM    1  Spinal stenosis of lumbar region, unspecified whether neurogenic claudication present  M48 061    2  Chronic bilateral low back pain, unspecified whether sciatica present  M54 50     G89 29    3   Partial hamstring tear, right, subsequent encounter  S76 311D        Start Time: 1115  Stop Time: 1215  Total time in clinic (min): 60 minutes    Assessment  Assessment details: Austin Arzola is a 74 y o  male who presents with signs and symptoms consistent with referring diagnosis of hamstring strain he has demonstrated improved pain levels and function with hamstring and is happy with progress  He presents today with added complaints of bilateral hip/glute pain that are consistent with diagnosis of lumbar stenosis  He demonstrates continued LE tightness ,decreased core stability, decreased l/s ROM, and decreased function and activity tolerance  Patient would benefit from continued physical therapy in order to address said deficits and improve functional mobility  Understanding of Dx/Px/POC: good   Prognosis: good    Goals  STG:  Decrease pain 1 grade-met  Increase ROM >25 %-met  Independent with basic HEP-partially met     LTG:  Decrease pain 2 grades- met  Increase ROM 50%-mostly met -not met   Improve flexibility to moderate restriction-progressing   Increase FOTO to expected score -progressing   Pt will walk > 50 yards without difficulty   Pt will be able to self-modulate symptoms   Increase strength to 5/5 -mostly met   Pt will be able to weed without difficulty -progressing   Normalize gait -progressing     Plan  Plan details: 1-2x/wk for 4-6 weeks   Patient would benefit from: skilled physical therapy  Planned therapy interventions: abdominal trunk stabilization, joint mobilization, massage, neuromuscular re-education, patient education, postural training, strengthening, stretching, therapeutic activities, therapeutic exercise, flexibility, functional ROM exercises, gait training, home exercise program, balance, body mechanics training and manual therapy  Treatment plan discussed with: patient        Subjective Evaluation    History of Present Illness  Mechanism of injury: Patient notes he still has a hard time walking because he still has pain in his hip and glute on both sides  He states he cannot walk > 100 feet without having pain  He states it will stop him from walking any distance   He states he has discs done in his back, in the 90's but does not recall  He reports he is still "babying" his hamstring a little bit  He is most concerned with his hip pain      -----------------------------------------------------------------------------------  Patient reports he still tries to be careful with his hamstring and how he is walking and what he is doing  He notes his hips have been painful for the last few months and isnt sure why, both sides  He states he cannot walk as long or due much due to pain  He has more hip pain when he goes up incline      -----------------------------------------------------------------------------------------  Patient reports on  he was moving a very heavy ~ 300# birth bath and felt a pop in his right hamstring, Pain still present when moving or sitting, no prior hx   He has contiued swelling into leg and foot  Bruising has subsided  Feels increased pain when sitting and stretching muscle, bending down quickly to pick something up  No pain to drive, pain lifting his leg , cannot pull weeds because of pain  Cant sit for > 1 hour due to pain     Radha Bauman is a 74 y o  male complains of right thigh pain  Onset of the symptoms was 2022  Mechanism of injury: Lifting down statue-estimate over 300 pounds  Aggravating factors: Bending forward at the waist  Treatment to date: none  Symptoms have stabilized      The following portions of the patient's history were reviewed and updated as appropriate: allergies, current medications, past family history, past medical history, past social history, past surgical history and problem list         Pain  Current pain ratin  At worst pain ratin    Patient Goals  Patient goal: weeding/yard work, sit without pain, pick things up        Objective     Tenderness     Additional Tenderness Details  Pain at medial hamstring insertion of semi membranosis/tendonosis at ischial tuberosity     Active Range of Motion     Lumbar   Flexion: 77 degrees   Extension: 11 (increased peripheralization ) degrees  with pain Restriction level: maximal  Left lateral flexion: 1 degrees       Right lateral flexion: 12 degrees     Right Knee   Normal active range of motion    Additional Active Range of Motion Details  90/90 hamstring left:57 degrees        Right: 58 degrees without pain     Left quad severe restriction  Right quad moderate restriction   Right hamstring moderate restriction-with pain   Left hamstring moderate restriction     Mechanical Assessment    Cervical      Thoracic      Lumbar    Standing flexion: repeated movements   Pain location:no change  Sitting flexion: sustained positions  Pain location: centralized  Pain intensity: better  Standing extension: repeated movements  Pain location: peripheralized  Pain intensity: worse    Strength/Myotome Testing     Left Hip   Planes of Motion   Flexion: 4+  Abduction: 4    Right Hip   Planes of Motion   Flexion: 4+  Extension: 4  Abduction: 4    Right Knee   Flexion: 4+  Extension: 5      Flowsheet Rows    Flowsheet Row Most Recent Value   PT/OT G-Codes    Current Score 60   Projected Score 69             Precautions: None          Manuals 6/1 6/8 6/15 RE 6/22 6/29 7/6 7/13 RE   Laser to prox h/s  20W 4:30 20W 4:30 20W 4:30 20W 4:30 20W 4:30 20W 4:30                                  Neuro Re-Ed          Quad sets           steamboats RTB 10 ea BL RTB 10 ea BL R Tb 10x bl 3 way  RTB 10 ea BL 3 way RTB 10 ea BL 3 way RTB 10 ea BL 3 way    TA       NV   PPT       NV   TA with march       NV   TA with iso abd/add        NV                                                                                             Ther Ex          bike 5' 5' 5'  5' 5' 5' 5'    Hamstring seated  20"x3 20" x3     NV   Prone quad stretch strap       NV   glute bridge  2x10 2x10 2x10 2x10 2x10 2x10    Clamshells  NV  NV    NV   SLR flexion x15 20 20x 20 1# 20 1# 20    SLR abduction  x15 15 20x  20 1# 20 1# 20    Knee ext machine 4# 2x10 4# 2x10 5# 2x10 machine 11# 2x10 machine 11# 2x10 machine 22# x15 22# mahine 2x10    Ham curl machine   NV light  11# 2x10 11# 2x10 22# x15 22# 2x10    Leg press           Prone hamstring eccentric lowering RTB loop 2x10 RTB loop 2x10 R TB 2x10  RTB 2x10 GTB NV GTB 2x10    TB side step  2 laps RTB  RTB 2 laps R TB 2laps  RTB 2 laps RTB 2 laps RTB 2 laps    Piriformis stretch bl    NV  15"x3 R NV     Ther Activity          Ball rolls fwd/side       NV   Seated flexion stretch       NV   Down dogs at bar        NV   DKCTC w/ ball       NV   LTR with ball       NV                                                               Modalities

## 2022-07-13 NOTE — LETTER
2022    Vinayak Timmons DO  [address]  [facility] [id number]    Patient: Qamar Strong   YOB: 1950   Date of Visit: 2022     Encounter Diagnosis     ICD-10-CM    1  Spinal stenosis of lumbar region, unspecified whether neurogenic claudication present  M48 061    2  Chronic bilateral low back pain, unspecified whether sciatica present  M54 50     G89 29    3  Partial hamstring tear, right, subsequent encounter  S76 311D        Dear Dr Maximino Desouza: Thank you for your recent referral of Qamar Strong  Please review the attached evaluation summary from Anthony's recent visit  Please verify that you agree with the plan of care by signing the attached order  If you have any questions or concerns, please do not hesitate to call  I sincerely appreciate the opportunity to share in the care of one of your patients and hope to have another opportunity to work with you in the near future  Sincerely,    Farzad Kahn, PT      Referring Provider:      I certify that I have read the below Plan of Care and certify the need for these services furnished under this plan of treatment while under my care  Vinayak Timmons DO  [facility] [address] [phone]  Via Fax: [phone]          PT Re-Evaluation     Today's date: 2022  Patient name: Qamar Strong  : 1950  MRN: [id number]  Referring provider: Ramez Sunshine DO  Dx:   Encounter Diagnosis     ICD-10-CM    1  Spinal stenosis of lumbar region, unspecified whether neurogenic claudication present  M48 061    2  Chronic bilateral low back pain, unspecified whether sciatica present  M54 50     G89 29    3   Partial hamstring tear, right, subsequent encounter  S76 311D                   Assessment  Assessment details: Qamar Strong is a 74 y o  male who presents with signs and symptoms consistent with referring diagnosis of hamstring strain he has demonstrated improved pain levels and function with hamstring and is happy with progress  He presents today with added complaints of bilateral hip/glute pain that are consistent with diagnosis of lumbar stenosis  He demonstrates continued LE tightness ,decreased core stability, decreased l/s ROM, and decreased function and activity tolerance  Patient would benefit from continued physical therapy in order to address said deficits and improve functional mobility  Understanding of Dx/Px/POC: good   Prognosis: good    Goals  STG:  Decrease pain 1 grade-met  Increase ROM >25 %-met  Independent with basic HEP-partially met     LTG:  Decrease pain 2 grades- met  Increase ROM 50%-mostly met -not met   Improve flexibility to moderate restriction-progressing   Increase FOTO to expected score -progressing   Pt will walk > 50 yards without difficulty   Pt will be able to self-modulate symptoms   Increase strength to 5/5 -mostly met   Pt will be able to weed without difficulty -progressing   Normalize gait -progressing     Plan  Plan details: 1-2x/wk for 4-6 weeks   Patient would benefit from: skilled physical therapy  Planned therapy interventions: abdominal trunk stabilization, joint mobilization, massage, neuromuscular re-education, patient education, postural training, strengthening, stretching, therapeutic activities, therapeutic exercise, flexibility, functional ROM exercises, gait training, home exercise program, balance, body mechanics training and manual therapy  Treatment plan discussed with: patient        Subjective Evaluation    History of Present Illness  Mechanism of injury: Patient notes he still has a hard time walking because he still has pain in his hip and glute on both sides  He states he cannot walk > 100 feet without having pain  He states it will stop him from walking any distance  He states he has discs done in his back, in the 90's but does not recall  He reports he is still "babying" his hamstring a little bit     He is most concerned with his hip pain      -----------------------------------------------------------------------------------  Patient reports he still tries to be careful with his hamstring and how he is walking and what he is doing  He notes his hips have been painful for the last few months and isnt sure why, both sides  He states he cannot walk as long or due much due to pain  He has more hip pain when he goes up incline      -----------------------------------------------------------------------------------------  Patient reports on  he was moving a very heavy ~ 300# birth bath and felt a pop in his right hamstring, Pain still present when moving or sitting, no prior hx   He has contiued swelling into leg and foot  Bruising has subsided  Feels increased pain when sitting and stretching muscle, bending down quickly to pick something up  No pain to drive, pain lifting his leg , cannot pull weeds because of pain  Cant sit for > 1 hour due to pain     Radha Bauman is a 74 y o  male complains of right thigh pain  Onset of the symptoms was 2022  Mechanism of injury: Lifting down statue-estimate over 300 pounds  Aggravating factors: Bending forward at the waist  Treatment to date: none  Symptoms have stabilized      The following portions of the patient's history were reviewed and updated as appropriate: allergies, current medications, past family history, past medical history, past social history, past surgical history and problem list         Pain  Current pain ratin  At worst pain ratin    Patient Goals  Patient goal: weeding/yard work, sit without pain, pick things up        Objective     Tenderness     Additional Tenderness Details  Pain at medial hamstring insertion of semi membranosis/tendonosis at ischial tuberosity     Active Range of Motion     Lumbar   Flexion: 77 degrees   Extension: 11 (increased peripheralization ) degrees  with pain Restriction level: maximal  Left lateral flexion: 1 degrees       Right lateral flexion: 12 degrees     Right Knee   Normal active range of motion    Additional Active Range of Motion Details  90/90 hamstring left:57 degrees        Right: 58 degrees without pain     Left quad severe restriction  Right quad moderate restriction   Right hamstring moderate restriction-with pain   Left hamstring moderate restriction     Mechanical Assessment    Cervical      Thoracic      Lumbar    Standing flexion: repeated movements   Pain location:no change  Sitting flexion: sustained positions  Pain location: centralized  Pain intensity: better  Standing extension: repeated movements  Pain location: peripheralized  Pain intensity: worse    Strength/Myotome Testing     Left Hip   Planes of Motion   Flexion: 4+  Abduction: 4    Right Hip   Planes of Motion   Flexion: 4+  Extension: 4  Abduction: 4    Right Knee   Flexion: 4+  Extension: 5             Precautions: None          Manuals 6/1 6/8 6/15 RE 6/22 6/29 7/6 7/13 RE   Laser to prox h/s  20W 4:30 20W 4:30 20W 4:30 20W 4:30 20W 4:30 20W 4:30                                  Neuro Re-Ed          Quad sets           steamboats RTB 10 ea BL RTB 10 ea BL R Tb 10x bl 3 way  RTB 10 ea BL 3 way RTB 10 ea BL 3 way RTB 10 ea BL 3 way    TA       NV   PPT       NV   TA with march       NV   TA with iso abd/add        NV                                                                                             Ther Ex          bike 5' 5' 5'  5' 5' 5' 5'    Hamstring seated  20"x3 20" x3     NV   Prone quad stretch strap       NV   glute bridge  2x10 2x10 2x10 2x10 2x10 2x10    Clamshells  NV  NV    NV   SLR flexion x15 20 20x 20 1# 20 1# 20    SLR abduction  x15 15 20x  20 1# 20 1# 20    Knee ext machine 4# 2x10 4# 2x10 5# 2x10  machine 11# 2x10 machine 11# 2x10 machine 22# x15 22# mahine 2x10    Ham curl machine   NV light  11# 2x10 11# 2x10 22# x15 22# 2x10    Leg press           Prone hamstring eccentric lowering RTB loop 2x10 RTB loop 2x10 R TB 2x10  RTB 2x10 GTB NV GTB 2x10    TB side step  2 laps RTB  RTB 2 laps R TB 2laps  RTB 2 laps RTB 2 laps RTB 2 laps    Piriformis stretch bl    NV  15"x3 R NV     Ther Activity          Ball rolls fwd/side       NV   Seated flexion stretch       NV   Down dogs at bar        NV   DKCTC w/ ball       NV   LTR with ball       NV                                                               Modalities

## 2022-07-20 ENCOUNTER — APPOINTMENT (OUTPATIENT)
Dept: PHYSICAL THERAPY | Facility: CLINIC | Age: 72
End: 2022-07-20
Payer: MEDICARE

## 2022-07-27 ENCOUNTER — EVALUATION (OUTPATIENT)
Dept: PHYSICAL THERAPY | Facility: CLINIC | Age: 72
End: 2022-07-27
Payer: MEDICARE

## 2022-07-27 DIAGNOSIS — M48.061 SPINAL STENOSIS OF LUMBAR REGION, UNSPECIFIED WHETHER NEUROGENIC CLAUDICATION PRESENT: Primary | ICD-10-CM

## 2022-07-27 DIAGNOSIS — M54.50 CHRONIC BILATERAL LOW BACK PAIN, UNSPECIFIED WHETHER SCIATICA PRESENT: ICD-10-CM

## 2022-07-27 DIAGNOSIS — G89.29 CHRONIC BILATERAL LOW BACK PAIN, UNSPECIFIED WHETHER SCIATICA PRESENT: ICD-10-CM

## 2022-07-27 PROCEDURE — 97110 THERAPEUTIC EXERCISES: CPT | Performed by: PHYSICAL THERAPIST

## 2022-07-27 PROCEDURE — 97112 NEUROMUSCULAR REEDUCATION: CPT | Performed by: PHYSICAL THERAPIST

## 2022-07-27 PROCEDURE — 97530 THERAPEUTIC ACTIVITIES: CPT | Performed by: PHYSICAL THERAPIST

## 2022-07-27 NOTE — PROGRESS NOTES
Daily Note     Today's date: 2022  Patient name: Yaquelin Marcos  : 1950  MRN: [id number]  Referring provider: Isaias Quezada DO  Dx:   Encounter Diagnosis     ICD-10-CM    1  Spinal stenosis of lumbar region, unspecified whether neurogenic claudication present  M48 061    2  Chronic bilateral low back pain, unspecified whether sciatica present  M54 50     G89 29                   Subjective: Patient reports his hips have been worse lately he isnt sure why  He still cannot walk for any distance  Objective: See treatment diary below      Assessment: Tolerated treatment well  Patient would benefit from continued PT He has pain and soreness with supine ball rolls with legs to left in both right and left hips (lateral glute) areas  He tolerates all fwd flexion without complaints  Sig difficulty performing PPT without bridging  Both tactile and verbal cues needed with only slight success  He has no complaints of hip pain or radicular pain with added core stability exercises today  Plan: Progress treatment as tolerated         Precautions: None          Manuals 6/1 6/8 6/15 RE  RE    Laser to prox h/s  20W 4:30 20W 4:30 20W 4:30 20W 4:30 20W 4:30 20W 4:30 DC                                     Neuro Re-Ed           Quad sets            steamboats RTB 10 ea BL RTB 10 ea BL R Tb 10x bl 3 way  RTB 10 ea BL 3 way RTB 10 ea BL 3 way RTB 10 ea BL 3 way     TA       NV 10x5"   PPT       NV 20x 5"   TA with march       NV    TA with iso abd/add        NV 10x5" ea                                                                                                       Ther Ex           bike 5' 5' 5'  5' 5' 5' 5'  5' lvl 2    Hamstring seated  20"x3 20" x3     NV    Prone quad stretch strap       NV    glute bridge  2x10 2x10 2x10 2x10 2x10 2x10     Clamshells  NV  NV    NV    SLR flexion x15 20 20x 20 1# 20 1# 20     SLR abduction  x15 15 20x  20 1# 20 1# 20     Knee ext machine 4# 2x10 4# 2x10 5# 2x10  machine 11# 2x10 machine 11# 2x10 machine 22# x15 22# mahine 2x10     Ham curl machine   NV light  11# 2x10 11# 2x10 22# x15 22# 2x10     Leg press            Prone hamstring eccentric lowering RTB loop 2x10 RTB loop 2x10 R TB 2x10  RTB 2x10 GTB NV GTB 2x10     TB side step  2 laps RTB  RTB 2 laps R TB 2laps  RTB 2 laps RTB 2 laps RTB 2 laps     Piriformis stretch bl    NV  15"x3 R NV      Ther Activity           Ball rolls fwd/side       NV 10x fwd, 5x sides   Seated flexion stretch       NV    Down dogs at bar        NV 15x5"   DKCTC w/ ball       NV 10x    LTR with ball       NV 10x ea                                                                      Modalities

## 2022-08-03 ENCOUNTER — OFFICE VISIT (OUTPATIENT)
Dept: PHYSICAL THERAPY | Facility: CLINIC | Age: 72
End: 2022-08-03
Payer: MEDICARE

## 2022-08-03 DIAGNOSIS — G89.29 CHRONIC BILATERAL LOW BACK PAIN, UNSPECIFIED WHETHER SCIATICA PRESENT: ICD-10-CM

## 2022-08-03 DIAGNOSIS — M48.061 SPINAL STENOSIS OF LUMBAR REGION, UNSPECIFIED WHETHER NEUROGENIC CLAUDICATION PRESENT: Primary | ICD-10-CM

## 2022-08-03 DIAGNOSIS — M54.50 CHRONIC BILATERAL LOW BACK PAIN, UNSPECIFIED WHETHER SCIATICA PRESENT: ICD-10-CM

## 2022-08-03 DIAGNOSIS — S76.311D PARTIAL HAMSTRING TEAR, RIGHT, SUBSEQUENT ENCOUNTER: ICD-10-CM

## 2022-08-03 PROCEDURE — 97110 THERAPEUTIC EXERCISES: CPT

## 2022-08-03 PROCEDURE — 97530 THERAPEUTIC ACTIVITIES: CPT

## 2022-08-03 PROCEDURE — 97112 NEUROMUSCULAR REEDUCATION: CPT

## 2022-08-03 NOTE — PROGRESS NOTES
Daily Note     Today's date: 8/3/2022  Patient name: Jez Rothman  : 1950  MRN: [id number]  Referring provider: Ananda Beard DO  Dx:   Encounter Diagnosis     ICD-10-CM    1  Spinal stenosis of lumbar region, unspecified whether neurogenic claudication present  M48 061    2  Chronic bilateral low back pain, unspecified whether sciatica present  M54 50     G89 29    3  Partial hamstring tear, right, subsequent encounter  S76 311D                   Subjective: Pt states he is sore from pulling weeds yesterday  Objective: See treatment diary below      Assessment: Tolerated treatment fair  Patient would benefit from continued PT  Cues needed w/ TA bracing for proper technique; reviewed proper technique w/ supine marches as well  Good tolerance to flexion based stretches  Pt has difficulty performing prone quad stretches due to position, and constant cues were needed to maintain proper technique  Plan: Progress treatment as tolerated  Add seated HS stretch nv as mary       Precautions: None          Manuals 8/3     7/6 7/13 RE    Laser to prox h/s  DC     20W 4:30 DC                                     Neuro Re-Ed           Quad sets            steamboats      RTB 10 ea BL 3 way     TA 10x5"      NV 10x5"   PPT 10x5"      NV 20x 5"   TA with march 10 ea      NV    TA with iso abd/add  10x5" ea      NV 10x5" ea                                                                                                       Ther Ex           bike 5' lv 2     5' 5'  5' lvl 2    Hamstring seated  NV      NV    Prone quad stretch strap 20"x3 ea      NV    glute bridge       2x10     Clamshells  NV      NV    SLR flexion      1# 20     SLR abduction       1# 20     Knee ext machine      machine 22# x15 22# mahine 2x10     Ham curl machine      22# x15 22# 2x10     Leg press            Prone hamstring eccentric lowering      GTB 2x10     TB side step       RTB 2 laps     Piriformis stretch bl            Ther Activity           Ball rolls fwd/side 10x fwd, 5x sides      NV 10x fwd, 5x sides   Seated flexion stretch       NV    Down dogs at bar  15x5"      NV 15x5"   DKTC w/ ball 10x3"      NV 10x    LTR with ball 10x3" ea      NV 10x ea                                                                      Modalities

## 2022-08-10 ENCOUNTER — OFFICE VISIT (OUTPATIENT)
Dept: PHYSICAL THERAPY | Facility: CLINIC | Age: 72
End: 2022-08-10
Payer: MEDICARE

## 2022-08-10 DIAGNOSIS — G89.29 CHRONIC BILATERAL LOW BACK PAIN, UNSPECIFIED WHETHER SCIATICA PRESENT: ICD-10-CM

## 2022-08-10 DIAGNOSIS — M48.061 SPINAL STENOSIS OF LUMBAR REGION, UNSPECIFIED WHETHER NEUROGENIC CLAUDICATION PRESENT: Primary | ICD-10-CM

## 2022-08-10 DIAGNOSIS — M54.50 CHRONIC BILATERAL LOW BACK PAIN, UNSPECIFIED WHETHER SCIATICA PRESENT: ICD-10-CM

## 2022-08-10 PROCEDURE — 97530 THERAPEUTIC ACTIVITIES: CPT

## 2022-08-10 PROCEDURE — 97112 NEUROMUSCULAR REEDUCATION: CPT

## 2022-08-10 PROCEDURE — 97110 THERAPEUTIC EXERCISES: CPT

## 2022-08-10 NOTE — PROGRESS NOTES
Daily Note     Today's date: 8/10/2022  Patient name: Don Hickey  : 1950  MRN: [id number]  Referring provider: Margoth Sánchez DO  Dx:   Encounter Diagnosis     ICD-10-CM    1  Spinal stenosis of lumbar region, unspecified whether neurogenic claudication present  M48 061    2  Chronic bilateral low back pain, unspecified whether sciatica present  M54 50     G89 29                   Subjective: Pt states no significant change in sx since last visit  Objective: See treatment diary below      Assessment: Tolerated treatment well  Patient would benefit from continued PT  Occasional cues still needed for proper technique throughout exercise program, cindy  w/ core exercises  Pt tends to perform more reps than needed  Plan: Progress treatment as tolerated         Precautions: None          Manuals 8/3 8/10    7/6 7/13 RE    Laser to prox h/s  DC     20W 4:30 DC                                     Neuro Re-Ed           Quad sets            steamboats      RTB 10 ea BL 3 way     TA 10x5"      NV 10x5"   PPT 10x5" 10x5"     NV 20x 5"   TA with march 10 ea 10 ea     NV    TA with iso abd/add  10x5" ea 10x5" ea      NV 10x5" ea                                                                                                       Ther Ex           bike 5' lv 2 5' lv 2    5' 5'  5' lvl 2    Hamstring seated  NV 20"x3 ea     NV    Prone quad stretch strap 20"x3 ea      NV    glute bridge       2x10     Clamshells  NV 15 ea     NV    SLR flexion      1# 20     SLR abduction       1# 20     Knee ext machine      machine 22# x15 22# mahine 2x10     Ham curl machine      22# x15 22# 2x10     Leg press            Prone hamstring eccentric lowering      GTB 2x10     TB side step       RTB 2 laps     Piriformis stretch bl            Ther Activity           Ball rolls fwd/side 10x fwd, 5x sides 10x fwd; 5x sides     NV 10x fwd, 5x sides   Seated flexion stretch       NV    Down dogs at bar  15x5" 10x5"     NV 15x5"   DKTC w/ ball 10x3" 10x3"     NV 10x    LTR with ball 10x3" ea 10x3" ea     NV 10x ea                                                                      Modalities

## 2022-08-17 ENCOUNTER — OFFICE VISIT (OUTPATIENT)
Dept: PHYSICAL THERAPY | Facility: CLINIC | Age: 72
End: 2022-08-17
Payer: MEDICARE

## 2022-08-17 DIAGNOSIS — M48.061 SPINAL STENOSIS OF LUMBAR REGION, UNSPECIFIED WHETHER NEUROGENIC CLAUDICATION PRESENT: Primary | ICD-10-CM

## 2022-08-17 DIAGNOSIS — G89.29 CHRONIC BILATERAL LOW BACK PAIN, UNSPECIFIED WHETHER SCIATICA PRESENT: ICD-10-CM

## 2022-08-17 DIAGNOSIS — M54.50 CHRONIC BILATERAL LOW BACK PAIN, UNSPECIFIED WHETHER SCIATICA PRESENT: ICD-10-CM

## 2022-08-17 PROCEDURE — 97112 NEUROMUSCULAR REEDUCATION: CPT

## 2022-08-17 PROCEDURE — 97530 THERAPEUTIC ACTIVITIES: CPT

## 2022-08-24 ENCOUNTER — APPOINTMENT (OUTPATIENT)
Dept: PHYSICAL THERAPY | Facility: CLINIC | Age: 72
End: 2022-08-24
Payer: MEDICARE

## 2022-08-31 ENCOUNTER — OFFICE VISIT (OUTPATIENT)
Dept: PHYSICAL THERAPY | Facility: CLINIC | Age: 72
End: 2022-08-31
Payer: MEDICARE

## 2022-08-31 DIAGNOSIS — G89.29 CHRONIC BILATERAL LOW BACK PAIN, UNSPECIFIED WHETHER SCIATICA PRESENT: ICD-10-CM

## 2022-08-31 DIAGNOSIS — M54.50 CHRONIC BILATERAL LOW BACK PAIN, UNSPECIFIED WHETHER SCIATICA PRESENT: ICD-10-CM

## 2022-08-31 DIAGNOSIS — M48.061 SPINAL STENOSIS OF LUMBAR REGION, UNSPECIFIED WHETHER NEUROGENIC CLAUDICATION PRESENT: Primary | ICD-10-CM

## 2022-08-31 DIAGNOSIS — S76.311D PARTIAL HAMSTRING TEAR, RIGHT, SUBSEQUENT ENCOUNTER: ICD-10-CM

## 2022-08-31 PROCEDURE — 97112 NEUROMUSCULAR REEDUCATION: CPT | Performed by: PHYSICAL THERAPIST

## 2022-08-31 PROCEDURE — 97530 THERAPEUTIC ACTIVITIES: CPT | Performed by: PHYSICAL THERAPIST

## 2022-08-31 PROCEDURE — 97110 THERAPEUTIC EXERCISES: CPT | Performed by: PHYSICAL THERAPIST

## 2022-08-31 NOTE — PROGRESS NOTES
Daily Note and Discharge     Today's date: 2022  Patient name: Mabel William  : 1950  MRN: [id number]  Referring provider: Bud Darden DO  Dx:   Encounter Diagnosis     ICD-10-CM    1  Spinal stenosis of lumbar region, unspecified whether neurogenic claudication present  M48 061    2  Partial hamstring tear, right, subsequent encounter  [id number]    3  Chronic bilateral low back pain, unspecified whether sciatica present  M54 50     G89 29                   Subjective: Pt states although he had increased back pain after one of his last few PT visits he could hardly move  He feels like his hip pain is getting worse  He states he can hardly walk any distance before he has such bad back and hip pain  He is trying to get int to see spine MD but hasnt gotten a call back yet  Objective: See treatment diary below      Assessment: Tolerated treatment fair  Patient had fair tolerance to mobility  He was educated on likely cause of spinal stenosis in l/s causing back and leg/hip pain  He has good tolerance to forward flexion based exercises and movements  Pt was educated on updated HEP in order to continue with progression while he is waiting to see MD         Plan: Patient will be DC at this time to follow up with MD and may return if/when needed        Precautions: None          Manuals 8/3 8/10 8/17 8/31  7/6 7/13 RE    Laser to prox h/s  DC     20W 4:30 DC                                     Neuro Re-Ed           Quad sets            steamboats      RTB 10 ea BL 3 way     TA 10x5"      NV 10x5"   PPT 10x5" 10x5"     NV 20x 5"   TA with march 10 ea 10 ea 10 ea    NV    TA with iso abd/add  10x5" ea 10x5" ea  10x5" ea    NV 10x5" ea                                                                                                       Ther Ex           bike 5' lv 2 5' lv 2 5' lv2 5' lvl 3  5' 5'  5' lvl 2    Hamstring seated  NV 20"x3 ea     NV    Prone quad stretch strap 20"x3 ea      NV    glute bridge       2x10     Clamshells  NV 15 ea     NV    SLR flexion      1# 20     SLR abduction       1# 20     Knee ext machine      machine 22# x15 22# mahine 2x10     Ham curl machine      22# x15 22# 2x10     Leg press            Prone hamstring eccentric lowering      GTB 2x10     TB side step       RTB 2 laps     Piriformis stretch bl            Ther Activity           Ball rolls fwd/side 10x fwd, 5x sides 10x fwd; 5x sides 10x fwd only 10x5" fwd   NV 10x fwd, 5x sides   Seated flexion stretch       NV    Down dogs at bar  15x5" 10x5"  10x5"    NV 15x5"   DKTC w/ ball 10x3" 10x3" 10x5" 10x5"    NV 10x    LTR with ball 10x3" ea 10x3" ea hold    NV 10x ea                                                                      Modalities

## 2023-02-14 NOTE — PROGRESS NOTES
PT Evaluation     Today's date: 2/15/2023  Patient name: Varney Goodpasture  : 1950  MRN: [id number]  Referring provider: Lyly Fontaine MD  Dx:   Encounter Diagnosis     ICD-10-CM    1  Spinal stenosis of lumbar region, unspecified whether neurogenic claudication present  M48 061       2  S/P lumbar laminectomy  Z98 890       3  Low back pain, unspecified back pain laterality, unspecified chronicity, unspecified whether sciatica present  M54 50                      Assessment  Assessment details: Varney Goodpasture is a 74 y o  male who presents s/p L4-5 bilateral laminectomies & foraminotomies on 23  He demonstrates decreased l/s ROM, significant  in LE flexibility, decreased LE and core strength, abnormal gait, decreased activity tolerance  Patient would benefit from skilled physical therapy in order to address said deficits and improve functional mobility          Understanding of Dx/Px/POC: good   Prognosis: good  Prognosis details: Poor compliance to prior PT     Goals  STG:  Decrease pain 1 grade  Increase ROM >25 %  Independent with basic HEP    LTG:  Decrease pain 2 grades  Increase ROM 50%  Improve flexibility to moderate restriction  Increase FOTO to expected score   Pt will walk > 20 mins without difficulty   Increase strength to 5/5   Pt will be able to weed without difficulty   Normalize gait   Improve flexibility to moderate restriction     Plan  Plan details: 1-2x/wk for 6-8 weeks   Patient would benefit from: skilled physical therapy  Planned therapy interventions: abdominal trunk stabilization, joint mobilization, massage, neuromuscular re-education, patient education, postural training, strengthening, stretching, therapeutic activities, therapeutic exercise, flexibility, functional ROM exercises, gait training, home exercise program, balance, body mechanics training and manual therapy  Duration in weeks: 8  Treatment plan discussed with: patient        Subjective Evaluation    History of Present Illness  Mechanism of injury: Patient is s/p 23 for L4-5 bilateral laminectomies & foraminotomies due to spinal stenosis  He notes his legs are stil feel weak and heavy, fatigue quickly  Worst is of he walks longer  He is concerned about going back to work  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  He has been since babying his leg and not pushing it  He notes numbness into toes currently that is about the same but a little better than surgery  He is able to drive but not "too far"     11# lifting restriction currently Denies bowel/bladder changes     history of lumbar stenosis with neurogenic claudication s/p bilateral L4-5 laminectomies on 23  He has not had bowel/bladder incontinence or saddle anesthesia  He has had no issues with his incision  He denies any redness, erythema, drainage  No fevers or chills     Pain  Current pain ratin  At worst pain ratin  Location: weakness/stiffness (mainly in legs)     Patient Goals  Patient goal: weeding/yard work, sit without pain, pick things up        Objective     Tenderness     Additional Tenderness Details  Pain at medial hamstring insertion of semi membranosis/tendonosis at ischial tuberosity     Active Range of Motion     Lumbar   Flexion: 68 degrees   Extension: 13 (increased peripheralization ) degrees  with pain Restriction level: maximal  Left lateral flexion: 18 degrees       Right lateral flexion: 15 degrees     Right Knee   Normal active range of motion    Additional Active Range of Motion Details  90/90 hamstring left:57 degrees        Right: 58 degrees without pain     Left quad severe restriction  Right quad moderate restriction   Right hamstring moderate restriction-with pain   Left hamstring moderate restriction     Mechanical Assessment    Cervical      Thoracic      Lumbar    Standing flexion: repeated movements   Pain location:no change  Sitting flexion: sustained positions  Pain location: centralized  Pain intensity: better  Standing extension: repeated movements  Pain location: peripheralized  Pain intensity: worse    Strength/Myotome Testing     Left Hip   Planes of Motion   Flexion: 4-  Extension: 3-  Abduction: 4-    Right Hip   Planes of Motion   Flexion: 4-  Extension: 3-  Abduction: 4    Right Knee   Flexion: 4+  Extension: 5    Additional Strength Details  Moderate hamstring restriction bilaterally  Severe quad restriction b/l L>R and severe hip flexor restriction b/l                  Precautions: None          Manuals 2/15            Scar cross friction massage             Prone quad stretch                                        Neuro Re-Ed             TA with PPT NV            TA with iso abd/add             TA with BKFO             TA 90/90 with heel taps             TA with march             TA with single leg lower             TA with dead bug              Steamboats              TB side step NV                                      Ther Ex             bike NV            Clamshells               bridge with TA              TB side steps              SLR flexion x10 bl            SLR abduction              Hamstring stretch at steps NV                                                                             Ther Activity             SKTC/DKCT 10x10"            LTR 10x10"            Ball rolls fwd/lateral NV                                      Modalities

## 2023-02-15 ENCOUNTER — EVALUATION (OUTPATIENT)
Dept: PHYSICAL THERAPY | Facility: CLINIC | Age: 73
End: 2023-02-15

## 2023-02-15 DIAGNOSIS — Z98.890 S/P LUMBAR LAMINECTOMY: ICD-10-CM

## 2023-02-15 DIAGNOSIS — M48.061 SPINAL STENOSIS OF LUMBAR REGION, UNSPECIFIED WHETHER NEUROGENIC CLAUDICATION PRESENT: Primary | ICD-10-CM

## 2023-02-15 DIAGNOSIS — M54.50 LOW BACK PAIN, UNSPECIFIED BACK PAIN LATERALITY, UNSPECIFIED CHRONICITY, UNSPECIFIED WHETHER SCIATICA PRESENT: ICD-10-CM

## 2023-02-16 ENCOUNTER — OFFICE VISIT (OUTPATIENT)
Dept: PHYSICAL THERAPY | Facility: CLINIC | Age: 73
End: 2023-02-16

## 2023-02-16 DIAGNOSIS — M54.50 LOW BACK PAIN, UNSPECIFIED BACK PAIN LATERALITY, UNSPECIFIED CHRONICITY, UNSPECIFIED WHETHER SCIATICA PRESENT: ICD-10-CM

## 2023-02-16 DIAGNOSIS — Z98.890 S/P LUMBAR LAMINECTOMY: ICD-10-CM

## 2023-02-16 DIAGNOSIS — M48.061 SPINAL STENOSIS OF LUMBAR REGION, UNSPECIFIED WHETHER NEUROGENIC CLAUDICATION PRESENT: Primary | ICD-10-CM

## 2023-02-16 NOTE — PROGRESS NOTES
Daily Note     Today's date: 2023  Patient name: Hilton Greer  : 1950  MRN: [id number]  Referring provider: Regis Luz MD  Dx:   Encounter Diagnosis     ICD-10-CM    1  Spinal stenosis of lumbar region, unspecified whether neurogenic claudication present  M48 061       2  S/P lumbar laminectomy  Z98 890       3  Low back pain, unspecified back pain laterality, unspecified chronicity, unspecified whether sciatica present  M54 50                      Subjective: Pt states everything is going well his legs just feel weak  Objective: See treatment diary below      Assessment: Tolerated treatment fair  Patient would benefit from continued PT  Good tolerance to self stretches, manual quad stretch  Quad fatigue noted w/ supine SLR  No adverse effects noted w/ scar CFM  Plan: Progress treatment as tolerated         Precautions: None          Manuals 2/15 2/16           Scar cross friction massage  3'           Prone quad stretch   5'                                     Neuro Re-Ed             TA with PPT NV 10x5"           TA with iso abd/add             TA with BKFO             TA 90/90 with heel taps             TA with march             TA with single leg lower             TA with dead bug              Steamboats              TB side step NV 2 laps                                     Ther Ex             bike NV 5'           Clamshells               bridge with TA              TB side steps              SLR flexion x10 bl 10 ea           SLR abduction              Hamstring stretch at steps NV 20"x3 ea                                                                            Ther Activity             SKTC/DKCT 10x10" 5x10" ea           LTR 10x10" 5x10" ea           Ball rolls fwd/lateral NV 10x5" ea                                     Modalities

## 2023-02-20 ENCOUNTER — OFFICE VISIT (OUTPATIENT)
Dept: PHYSICAL THERAPY | Facility: CLINIC | Age: 73
End: 2023-02-20

## 2023-02-20 DIAGNOSIS — M54.50 LOW BACK PAIN, UNSPECIFIED BACK PAIN LATERALITY, UNSPECIFIED CHRONICITY, UNSPECIFIED WHETHER SCIATICA PRESENT: ICD-10-CM

## 2023-02-20 DIAGNOSIS — M48.061 SPINAL STENOSIS OF LUMBAR REGION, UNSPECIFIED WHETHER NEUROGENIC CLAUDICATION PRESENT: Primary | ICD-10-CM

## 2023-02-20 DIAGNOSIS — Z98.890 S/P LUMBAR LAMINECTOMY: ICD-10-CM

## 2023-02-20 NOTE — PROGRESS NOTES
Daily Note     Today's date: 2023  Patient name: Don Hickey  : 1950  MRN: [id number]  Referring provider: Chai Tate MD  Dx:   Encounter Diagnosis     ICD-10-CM    1  Spinal stenosis of lumbar region, unspecified whether neurogenic claudication present  M48 061       2  S/P lumbar laminectomy  Z98 890       3  Low back pain, unspecified back pain laterality, unspecified chronicity, unspecified whether sciatica present  M54 50                      Subjective: Patient reports he is a bit stiff today though not feeling too bad  He said his back felt stiff after last PT session  Objective: See treatment diary below      Assessment: Tolerated treatment well  Patient would benefit from continued PT  Patient requires verbal cues to breathe and tactile cues to assure he is activating TA  He has discomfort with manual quad stretching however is able to tolerate  No tenderness and noticeable flattening of scar noted with friction massage  He has no complaints post session  Plan: Progress treatment as tolerated         Precautions: None          Manuals 2/15 2/16 2/20          Scar cross friction massage  3' 3'          Prone quad stretch   5' 5'                                    Neuro Re-Ed             TA with PPT NV 10x5" 10x5"          TA with iso abd/add  10x5" ea  ea          TA with BKFO  10x ea  10x ea           TA 90/90 with heel taps             TA with march   NV          TA with single leg lower             TA with dead bug              Steamboats              TB side step NV 2 laps G TB x3 laps           TA with TB extensions     NV         TA with TB anti rotation press              Ther Ex             bike NV 5' 6' lvl 3           Clamshells               bridge with TA                           SLR flexion x10 bl 10 ea NV          SLR abduction              Hamstring stretch at steps NV 20"x3 ea 20"X3 bl Ther Activity             SKTC/DKCT 10x10" 5x10" ea SKTC 10"X3 bl          LTR 10x10" 5x10" ea           Ball rolls fwd/lateral NV 10x5" ea 10x5"                                     Modalities

## 2023-02-22 ENCOUNTER — OFFICE VISIT (OUTPATIENT)
Dept: PHYSICAL THERAPY | Facility: CLINIC | Age: 73
End: 2023-02-22

## 2023-02-22 DIAGNOSIS — M54.50 LOW BACK PAIN, UNSPECIFIED BACK PAIN LATERALITY, UNSPECIFIED CHRONICITY, UNSPECIFIED WHETHER SCIATICA PRESENT: ICD-10-CM

## 2023-02-22 DIAGNOSIS — Z98.890 S/P LUMBAR LAMINECTOMY: ICD-10-CM

## 2023-02-22 DIAGNOSIS — M48.061 SPINAL STENOSIS OF LUMBAR REGION, UNSPECIFIED WHETHER NEUROGENIC CLAUDICATION PRESENT: Primary | ICD-10-CM

## 2023-02-22 NOTE — PROGRESS NOTES
Daily Note     Today's date: 2023  Patient name: Bryce Collado  : 1950  MRN: [id number]  Referring provider: Pepe Broussard MD  Dx:   Encounter Diagnosis     ICD-10-CM    1  Spinal stenosis of lumbar region, unspecified whether neurogenic claudication present  M48 061       2  S/P lumbar laminectomy  Z98 890       3  Low back pain, unspecified back pain laterality, unspecified chronicity, unspecified whether sciatica present  M54 50                      Subjective: Pt states he was sore after last visit  Objective: See treatment diary below      Assessment: Tolerated treatment fair  Patient would benefit from continued PT   Pt needs cues w/ BKFO, marches for proper technique, maintain core stability  L quad tightness > R  No adverse effects w/ manual techniques  Plan: Progress treatment as tolerated         Precautions: None          Manuals 2/15 2/16 2/20 2/22         Scar cross friction massage  3' 3' 3'         Prone quad stretch   5' 5' 5'                                   Neuro Re-Ed             TA with PPT NV 10x5" 10x5" 10x5"         TA with iso abd/add  10x5" ea  ea 10x5" ea         TA with BKFO  10x ea  10x ea  10 ea         TA 90/90 with heel taps             TA with march   NV 10 ea         TA with single leg lower             TA with dead bug              Steamboats              TB side step NV 2 laps G TB x3 laps  GTB 3 laps         TA with TB extensions     NV         TA with TB anti rotation press              Ther Ex             bike NV 5' 6' lvl 3  6' lv 3         Clamshells               bridge with TA                           SLR flexion x10 bl 10 ea NV 10 ea         SLR abduction              Hamstring stretch at steps NV 20"x3 ea 20"X3 bl  20"x3 ea                                                                          Ther Activity             SKTC/DKCT 10x10" 5x10" ea SKTC 10"X3 bl          LTR 10x10" 5x10" ea           Ball rolls fwd/lateral NV 10x5" ea 10x5" 10x5"                                   Modalities

## 2023-02-27 ENCOUNTER — OFFICE VISIT (OUTPATIENT)
Dept: PHYSICAL THERAPY | Facility: CLINIC | Age: 73
End: 2023-02-27

## 2023-02-27 DIAGNOSIS — M48.061 SPINAL STENOSIS OF LUMBAR REGION, UNSPECIFIED WHETHER NEUROGENIC CLAUDICATION PRESENT: Primary | ICD-10-CM

## 2023-02-27 DIAGNOSIS — M54.50 LOW BACK PAIN, UNSPECIFIED BACK PAIN LATERALITY, UNSPECIFIED CHRONICITY, UNSPECIFIED WHETHER SCIATICA PRESENT: ICD-10-CM

## 2023-02-27 DIAGNOSIS — Z98.890 S/P LUMBAR LAMINECTOMY: ICD-10-CM

## 2023-02-27 NOTE — PROGRESS NOTES
Daily Note     Today's date: 2023  Patient name: Ramya Elder  : 1950  MRN: [id number]  Referring provider: Eligio Viramontes MD  Dx:   Encounter Diagnosis     ICD-10-CM    1  Spinal stenosis of lumbar region, unspecified whether neurogenic claudication present  M48 061       2  S/P lumbar laminectomy  Z98 890       3  Low back pain, unspecified back pain laterality, unspecified chronicity, unspecified whether sciatica present  M54 50                      Subjective: Pt reports he returned to work today, states he tried calling surgeons office 3x prior to return to work but did not receive a phone call back  Pt reports soreness, otherwise no additional complaints  Patient advised to avoid any strenuous activities at work also advised to call surgeon again regarding work status  Objective: See treatment diary below      Assessment: Patient is challenged with maintaining core contraction t/o TE program, frequent verbal cues to address this, fair carry over observed  Reflexive core TE added to promote futher core engagement, able to complete tband pull downs with ease consider GTB NV  Patient offers no complaints post session, would benefit from continued PT  Plan: Cont per plan of care       Precautions: None          Manuals 2/15 2/16 2/20 2/22 2/27        Scar cross friction massage  3' 3' 3' 3'        Prone quad stretch   5' 5' 5' 5'                                  Neuro Re-Ed             TA with PPT NV 10x5" 10x5" 10x5" 10x5"        TA with iso abd/add  10x5" ea  ea 10x5" ea 10x5" ea        TA with BKFO  10x ea  10x ea  10 ea 10ea        TA 90/90 with heel taps             TA with march   NV 10 ea 10x ea        TA with single leg lower             TA with dead bug              Steamboats              TB side step NV 2 laps G TB x3 laps  GTB 3 laps GTB 3 laps GTB 3 laps       TA with TB extensions     NV RTB 20x  GTB NV       TA with TB anti rotation press              Ther Ex bike NV 5' 6' lvl 3  6' lv 3 6' l3        Clamshells               bridge with TA                           SLR flexion x10 bl 10 ea NV 10 ea 10x ea        SLR abduction              Hamstring stretch at steps NV 20"x3 ea 20"X3 bl  20"x3 ea 20"x3 ea                                                                         Ther Activity             SKTC/DKCT 10x10" 5x10" ea SKTC 10"X3 bl          LTR 10x10" 5x10" ea           Ball rolls fwd/lateral NV 10x5" ea 10x5"  10x5" 10x5" fwd                                  Modalities

## 2023-03-01 ENCOUNTER — OFFICE VISIT (OUTPATIENT)
Dept: PHYSICAL THERAPY | Facility: CLINIC | Age: 73
End: 2023-03-01

## 2023-03-01 DIAGNOSIS — M48.061 SPINAL STENOSIS OF LUMBAR REGION, UNSPECIFIED WHETHER NEUROGENIC CLAUDICATION PRESENT: ICD-10-CM

## 2023-03-01 DIAGNOSIS — Z98.890 S/P LUMBAR LAMINECTOMY: Primary | ICD-10-CM

## 2023-03-01 DIAGNOSIS — M54.50 LOW BACK PAIN, UNSPECIFIED BACK PAIN LATERALITY, UNSPECIFIED CHRONICITY, UNSPECIFIED WHETHER SCIATICA PRESENT: ICD-10-CM

## 2023-03-01 NOTE — PROGRESS NOTES
Daily Note     Today's date: 3/1/2023  Patient name: Seema Hill  : 1950  MRN: [id number]  Referring provider: Kassy Crystal MD  Dx:   Encounter Diagnosis     ICD-10-CM    1  S/P lumbar laminectomy  Z98 890       2  Spinal stenosis of lumbar region, unspecified whether neurogenic claudication present  M48 061       3  Low back pain, unspecified back pain laterality, unspecified chronicity, unspecified whether sciatica present  M54 50                      Subjective: Pt reports he is stiff today  He feels like he is getting worked here at [employer] and worked at work  He still has not gotten a call back  From his doctors office  Objective: See treatment diary below      Assessment: Patient is challenged tolerates session well  He continues to have difficulty with maintaining TA , especially with dyamic movement  He requires cues for proper breathing and for counting  Patient reports no tenderness along incision any longer and is improving mobility/flexibility with quad stretching  Contiue to progress as tolerated  Plan: Cont per plan of care       Precautions: None          Manuals 2/15 2/16 2/20 2/22 2/27 3/1       Scar cross friction massage  3' 3' 3' 3'        Prone quad stretch   5' 5' 5' 5'                                  Neuro Re-Ed             TA with PPT NV 10x5" 10x5" 10x5" 10x5" 5x5"        TA with iso abd/add  10x5" ea  ea 10x5" ea 10x5" ea 10x5"        TA with BKFO  10x ea  10x ea  10 ea 10ea NV       TA 90/90 with heel taps             TA with march   NV 10 ea 10x ea 20x ea        TA with single leg lower      NV       TA with dead bug              Steamboats              TB side step NV 2 laps G TB x3 laps  GTB 3 laps GTB 3 laps GTB 3 laps       TA with TB extensions     NV RTB 20x  GTB 20x        TA with TB row             TA with TB anti rotation press       NV       Ther Ex             bike NV 5' 6' lvl 3  6' lv 3 6' l3 6' lvl 3        Clamshells               bridge with TA SLR flexion x10 bl 10 ea NV 10 ea 10x ea        SLR abduction              Hamstring stretch at steps NV 20"x3 ea 20"X3 bl  20"x3 ea 20"x3 ea                                                                         Ther Activity             SKTC/DKCT 10x10" 5x10" ea SKTC 10"X3 bl          LTR 10x10" 5x10" ea           Ball rolls fwd/lateral NV 10x5" ea 10x5"  10x5" 10x5" fwd 10x5" fwd                                 Modalities

## 2023-03-07 ENCOUNTER — OFFICE VISIT (OUTPATIENT)
Dept: PHYSICAL THERAPY | Facility: CLINIC | Age: 73
End: 2023-03-07

## 2023-03-07 DIAGNOSIS — M54.50 LOW BACK PAIN, UNSPECIFIED BACK PAIN LATERALITY, UNSPECIFIED CHRONICITY, UNSPECIFIED WHETHER SCIATICA PRESENT: ICD-10-CM

## 2023-03-07 DIAGNOSIS — M48.061 SPINAL STENOSIS OF LUMBAR REGION, UNSPECIFIED WHETHER NEUROGENIC CLAUDICATION PRESENT: ICD-10-CM

## 2023-03-07 DIAGNOSIS — Z98.890 S/P LUMBAR LAMINECTOMY: Primary | ICD-10-CM

## 2023-03-07 NOTE — PROGRESS NOTES
Daily Note     Today's date: 3/7/2023  Patient name: Kan Blas  : 1950  MRN: [id number]  Referring provider: Jay Castro MD  Dx:   Encounter Diagnosis     ICD-10-CM    1  S/P lumbar laminectomy  Z98 890       2  Low back pain, unspecified back pain laterality, unspecified chronicity, unspecified whether sciatica present  M54 50       3  Spinal stenosis of lumbar region, unspecified whether neurogenic claudication present  M48 061                      Subjective: Pt reports he still gets stiff the day after therapy or the day after work  Discussed needing to move around more at home after PT and work  Objective: See treatment diary below      Assessment: Patient tolerates session well  He has increased fatigue with exercises into core and LEs with exercises  Has good tolerance to progressions today though cues needed to ensure counting of reps  He has reports of no pain or stiffness post session today      Plan: Progress as tolerated      Precautions: None          Manuals 2/15 2/16 2/20 2/22 2/27 3/1 3/7      Scar cross friction massage  3' 3' 3' 3'        Prone quad stretch   5' 5' 5' 5'                                  Neuro Re-Ed             TA with PPT NV 10x5" 10x5" 10x5" 10x5" 5x5"  5x5"      TA with iso abd/add  10x5" ea  ea 10x5" ea 10x5" ea 10x5"        TA with BKFO  10x ea  10x ea  10 ea 10ea NV 10x bl       TA 90/90 with heel taps             TA with march   NV 10 ea 10x ea 20x ea  20x ea       TA with single leg lower      NV 15x ea       TA with dead bug              Steamboats        G TB x10 bl 3 way       TB side step NV 2 laps G TB x3 laps  GTB 3 laps GTB 3 laps GTB 3 laps G TB x2 laps       TA with TB extensions     NV RTB 20x  GTB 20x  G TB 20x       TA with TB row             TA with TB anti rotation press        NV      Ther Ex             bike NV 5' 6' lvl 3  6' lv 3 6' l3 6' lvl 3  7' lvl 4       Clamshells               bridge with TA                           SLR flexion x10 bl 10 ea NV 10 ea 10x ea  10x      SLR abduction              Hamstring stretch at steps NV 20"x3 ea 20"X3 bl  20"x3 ea 20"x3 ea                                                                         Ther Activity             SKTC/DKCT 10x10" 5x10" ea SKTC 10"X3 bl          LTR 10x10" 5x10" ea           Ball rolls fwd/lateral NV 10x5" ea 10x5"  10x5" 10x5" fwd 10x5" fwd 10x5" fwd                                Modalities

## 2023-03-09 ENCOUNTER — OFFICE VISIT (OUTPATIENT)
Dept: PHYSICAL THERAPY | Facility: CLINIC | Age: 73
End: 2023-03-09

## 2023-03-09 DIAGNOSIS — Z98.890 S/P LUMBAR LAMINECTOMY: Primary | ICD-10-CM

## 2023-03-09 DIAGNOSIS — M54.50 LOW BACK PAIN, UNSPECIFIED BACK PAIN LATERALITY, UNSPECIFIED CHRONICITY, UNSPECIFIED WHETHER SCIATICA PRESENT: ICD-10-CM

## 2023-03-09 DIAGNOSIS — M48.061 SPINAL STENOSIS OF LUMBAR REGION, UNSPECIFIED WHETHER NEUROGENIC CLAUDICATION PRESENT: ICD-10-CM

## 2023-03-14 ENCOUNTER — OFFICE VISIT (OUTPATIENT)
Dept: PHYSICAL THERAPY | Facility: CLINIC | Age: 73
End: 2023-03-14

## 2023-03-14 DIAGNOSIS — M48.061 SPINAL STENOSIS OF LUMBAR REGION, UNSPECIFIED WHETHER NEUROGENIC CLAUDICATION PRESENT: ICD-10-CM

## 2023-03-14 DIAGNOSIS — Z98.890 S/P LUMBAR LAMINECTOMY: Primary | ICD-10-CM

## 2023-03-14 DIAGNOSIS — M54.50 LOW BACK PAIN, UNSPECIFIED BACK PAIN LATERALITY, UNSPECIFIED CHRONICITY, UNSPECIFIED WHETHER SCIATICA PRESENT: ICD-10-CM

## 2023-03-14 NOTE — PROGRESS NOTES
Daily Note     Today's date: 3/14/2023  Patient name: Gonsalo Barajas  : 1950  MRN: [id number]  Referring provider: Eleanor Marino MD  Dx:   Encounter Diagnosis     ICD-10-CM    1  S/P lumbar laminectomy  Z98 890       2  Low back pain, unspecified back pain laterality, unspecified chronicity, unspecified whether sciatica present  M54 50       3  Spinal stenosis of lumbar region, unspecified whether neurogenic claudication present  M48 061                      Subjective: Patient reports he does not get as stiff after PT or working  He does not have pain any longer in his back or legs  He still feels stiffness more so into his calves  He sees MD later today  Objective: See treatment diary below      Assessment: Tolerated treatment well  Patient would benefit from continued PT  Patient tolerates session well  No complaints of pain with exercises  Improved core activation however has difficulty maintaining with exercises  No difficulty with progression to blue TB today for extensions  Pt tolerates progressions in core stability exercises well without complaints  Plan: Progress treatment as tolerated  May wish to be DC NV?       Precautions: None          Manuals 2/15 2/16 2/20 2/22 2/27 3/1 3/7 3/9 3/14    Scar cross friction massage  3' 3' 3' 3'        Prone quad stretch   5' 5' 5' 5'                                  Neuro Re-Ed             TA with PPT NV 10x5" 10x5" 10x5" 10x5" 5x5"  5x5" 5x5" 5x5"     TA with iso abd/add  10x5" ea  ea 10x5" ea 10x5" ea 10x5"        TA with BKFO  10x ea  10x ea  10 ea 10ea NV 10x bl  10 ea 10x ea     TA 90/90 with heel taps             TA with march   NV 10 ea 10x ea 20x ea  20x ea  10 ea     TA with single leg lower      NV 15x ea  10 ea  10x ea with swing out     TA with dead bug          x10 bl     Steamboats        G TB x10 bl 3 way  GTB 15 ea 3 way G TB x10 bl 3 way     TB side step NV 2 laps G TB x3 laps  GTB 3 laps GTB 3 laps GTB 3 laps G TB x2 laps GTB 2 laps G TB 2 laps     TA with TB extensions     NV RTB 20x  GTB 20x  G TB 20x  GTB 20 B TB 2x10     TA with TB row             TA with TB anti rotation press        NV GTB 10 ea G TB x10 bl     Ther Ex             bike NV 5' 6' lvl 3  6' lv 3 6' l3 6' lvl 3  7' lvl 4  6' lv 4 6' lvl4     Clamshells               bridge with TA                           SLR flexion x10 bl 10 ea NV 10 ea 10x ea  10x 10 ea 2x10     SLR abduction              Hamstring stretch at steps NV 20"x3 ea 20"X3 bl  20"x3 ea 20"x3 ea                                                                         Ther Activity             SKTC/DKCT 10x10" 5x10" ea SKTC 10"X3 bl          LTR 10x10" 5x10" ea           Ball rolls fwd/lateral NV 10x5" ea 10x5"  10x5" 10x5" fwd 10x5" fwd 10x5" fwd 10x5" fwd                               Modalities

## 2023-03-16 ENCOUNTER — OFFICE VISIT (OUTPATIENT)
Dept: PHYSICAL THERAPY | Facility: CLINIC | Age: 73
End: 2023-03-16

## 2023-03-16 DIAGNOSIS — Z98.890 S/P LUMBAR LAMINECTOMY: Primary | ICD-10-CM

## 2023-03-16 DIAGNOSIS — M48.061 SPINAL STENOSIS OF LUMBAR REGION, UNSPECIFIED WHETHER NEUROGENIC CLAUDICATION PRESENT: ICD-10-CM

## 2023-03-16 DIAGNOSIS — M54.50 LOW BACK PAIN, UNSPECIFIED BACK PAIN LATERALITY, UNSPECIFIED CHRONICITY, UNSPECIFIED WHETHER SCIATICA PRESENT: ICD-10-CM

## 2023-03-16 NOTE — PROGRESS NOTES
Daily Note     Today's date: 3/16/2023  Patient name: Cem Miller  : 1950  MRN: [id number]  Referring provider: Artem Amaya MD  Dx:   Encounter Diagnosis     ICD-10-CM    1  S/P lumbar laminectomy  Z98 890       2  Low back pain, unspecified back pain laterality, unspecified chronicity, unspecified whether sciatica present  M54 [id number]       3  Spinal stenosis of lumbar region, unspecified whether neurogenic claudication present  M48 061                      Subjective: Pt states MD was pleased w/ his progress so far and will contact him in 6 weeks to follow up  Objective: See treatment diary below      Assessment: Tolerated treatment well  Patient would benefit from continued PT  Cues still needed to maintain proper technique; struggles w/ TA activation/holds  Cues also needed w/ dead bug for proper technique  No adverse effects noted w/ TE performed  Plan: Continue per plan of care   Probable DC to [facility] per pt request      Precautions: None          Manuals 2/15 2/16 2/20 2/22 2/27 3/1 3/7 3/9 3/14 3/16   Scar cross friction massage  3' 3' 3' 3'        Prone quad stretch   5' 5' 5' 5'                                  Neuro Re-Ed             TA with PPT NV 10x5" 10x5" 10x5" 10x5" 5x5"  5x5" 5x5" 5x5"     TA with iso abd/add  10x5" ea  ea 10x5" ea 10x5" ea 10x5"        TA with BKFO  10x ea  10x ea  10 ea 10ea NV 10x bl  10 ea 10x ea  10 ea   TA 90/90 with heel taps             TA with march   NV 10 ea 10x ea 20x ea  20x ea  10 ea     TA with single leg lower      NV 15x ea  10 ea  10x ea with swing out  10 ea w/ swing out   TA with dead bug          x10 bl  10 ea   Steamboats        G TB x10 bl 3 way  GTB 15 ea 3 way G TB x10 bl 3 way  GTB 10 ea 3 way BL   TB side step NV 2 laps G TB x3 laps  GTB 3 laps GTB 3 laps GTB 3 laps G TB x2 laps  GTB 2 laps G TB 2 laps  GTB 2 laps   TA with TB extensions     NV RTB 20x  GTB 20x  G TB 20x  GTB 20 B TB 2x10  BTB 2x10   TA with TB row             TA with TB anti rotation press        NV GTB 10 ea G TB x10 bl  GTB 10 ea   Ther Ex             bike NV 5' 6' lvl 3  6' lv 3 6' l3 6' lvl 3  7' lvl 4  6' lv 4 6' lvl4  6' lv 4   Clamshells               bridge with TA                           SLR flexion x10 bl 10 ea NV 10 ea 10x ea  10x 10 ea 2x10  2x10 ea   SLR abduction              Hamstring stretch at steps NV 20"x3 ea 20"X3 bl  20"x3 ea 20"x3 ea                                                                         Ther Activity             SKTC/DKCT 10x10" 5x10" ea SKTC 10"X3 bl          LTR 10x10" 5x10" ea           Ball rolls fwd/lateral NV 10x5" ea 10x5"  10x5" 10x5" fwd 10x5" fwd 10x5" fwd 10x5" fwd  10x5" fwd                             Modalities                                          1:1 11:45-12:08